# Patient Record
Sex: FEMALE | Race: WHITE | Employment: FULL TIME | URBAN - METROPOLITAN AREA
[De-identification: names, ages, dates, MRNs, and addresses within clinical notes are randomized per-mention and may not be internally consistent; named-entity substitution may affect disease eponyms.]

---

## 2022-07-27 ENCOUNTER — APPOINTMENT (OUTPATIENT)
Dept: ULTRASOUND IMAGING | Age: 27
End: 2022-07-27
Payer: COMMERCIAL

## 2022-07-27 ENCOUNTER — HOSPITAL ENCOUNTER (EMERGENCY)
Age: 27
Discharge: HOME | End: 2022-07-27
Payer: COMMERCIAL

## 2022-07-27 VITALS
WEIGHT: 215 LBS | OXYGEN SATURATION: 100 % | HEIGHT: 69 IN | TEMPERATURE: 97.8 F | DIASTOLIC BLOOD PRESSURE: 82 MMHG | HEART RATE: 77 BPM | RESPIRATION RATE: 16 BRPM | SYSTOLIC BLOOD PRESSURE: 133 MMHG | BODY MASS INDEX: 31.84 KG/M2

## 2022-07-27 DIAGNOSIS — O46.90 VAGINAL BLEEDING IN PREGNANCY: Primary | ICD-10-CM

## 2022-07-27 DIAGNOSIS — O20.0 THREATENED MISCARRIAGE IN EARLY PREGNANCY: ICD-10-CM

## 2022-07-27 LAB
ABO/RH: NORMAL
ANION GAP SERPL CALCULATED.3IONS-SCNC: 11 MMOL/L (ref 7–16)
BASOPHILS ABSOLUTE: 0.06 E9/L (ref 0–0.2)
BASOPHILS RELATIVE PERCENT: 0.7 % (ref 0–2)
BUN BLDV-MCNC: 11 MG/DL (ref 6–20)
CALCIUM SERPL-MCNC: 9.1 MG/DL (ref 8.6–10.2)
CHLORIDE BLD-SCNC: 104 MMOL/L (ref 98–107)
CO2: 23 MMOL/L (ref 22–29)
CREAT SERPL-MCNC: 0.7 MG/DL (ref 0.5–1)
EOSINOPHILS ABSOLUTE: 0.18 E9/L (ref 0.05–0.5)
EOSINOPHILS RELATIVE PERCENT: 2.1 % (ref 0–6)
GFR AFRICAN AMERICAN: >60
GFR NON-AFRICAN AMERICAN: >60 ML/MIN/1.73
GLUCOSE BLD-MCNC: 93 MG/DL (ref 74–99)
GONADOTROPIN, CHORIONIC (HCG) QUANT: 171.9 MIU/ML
HCG, URINE, POC: POSITIVE
HCT VFR BLD CALC: 39.6 % (ref 34–48)
HEMOGLOBIN: 13.3 G/DL (ref 11.5–15.5)
IMMATURE GRANULOCYTES #: 0.02 E9/L
IMMATURE GRANULOCYTES %: 0.2 % (ref 0–5)
LYMPHOCYTES ABSOLUTE: 2.58 E9/L (ref 1.5–4)
LYMPHOCYTES RELATIVE PERCENT: 29.5 % (ref 20–42)
Lab: NORMAL
MCH RBC QN AUTO: 29.6 PG (ref 26–35)
MCHC RBC AUTO-ENTMCNC: 33.6 % (ref 32–34.5)
MCV RBC AUTO: 88.2 FL (ref 80–99.9)
MONOCYTES ABSOLUTE: 0.75 E9/L (ref 0.1–0.95)
MONOCYTES RELATIVE PERCENT: 8.6 % (ref 2–12)
NEGATIVE QC PASS/FAIL: NORMAL
NEUTROPHILS ABSOLUTE: 5.17 E9/L (ref 1.8–7.3)
NEUTROPHILS RELATIVE PERCENT: 58.9 % (ref 43–80)
PDW BLD-RTO: 12.6 FL (ref 11.5–15)
PLATELET # BLD: 292 E9/L (ref 130–450)
PMV BLD AUTO: 9.3 FL (ref 7–12)
POSITIVE QC PASS/FAIL: NORMAL
POTASSIUM REFLEX MAGNESIUM: 4.1 MMOL/L (ref 3.5–5)
RBC # BLD: 4.49 E12/L (ref 3.5–5.5)
SODIUM BLD-SCNC: 138 MMOL/L (ref 132–146)
WBC # BLD: 8.8 E9/L (ref 4.5–11.5)

## 2022-07-27 PROCEDURE — 86900 BLOOD TYPING SEROLOGIC ABO: CPT

## 2022-07-27 PROCEDURE — 99284 EMERGENCY DEPT VISIT MOD MDM: CPT

## 2022-07-27 PROCEDURE — 76817 TRANSVAGINAL US OBSTETRIC: CPT

## 2022-07-27 PROCEDURE — 85025 COMPLETE CBC W/AUTO DIFF WBC: CPT

## 2022-07-27 PROCEDURE — 86901 BLOOD TYPING SEROLOGIC RH(D): CPT

## 2022-07-27 PROCEDURE — 80048 BASIC METABOLIC PNL TOTAL CA: CPT

## 2022-07-27 PROCEDURE — 84702 CHORIONIC GONADOTROPIN TEST: CPT

## 2022-07-27 ASSESSMENT — ENCOUNTER SYMPTOMS
VOMITING: 0
COUGH: 0
BACK PAIN: 0
SORE THROAT: 0
CONSTIPATION: 0
SHORTNESS OF BREATH: 0
TROUBLE SWALLOWING: 0
DIARRHEA: 0
CHEST TIGHTNESS: 0
ABDOMINAL PAIN: 0
FACIAL SWELLING: 0
NAUSEA: 0
COLOR CHANGE: 0
SINUS PAIN: 0
SINUS PRESSURE: 0

## 2022-07-27 ASSESSMENT — PAIN - FUNCTIONAL ASSESSMENT: PAIN_FUNCTIONAL_ASSESSMENT: 0-10

## 2022-07-27 ASSESSMENT — PAIN SCALES - GENERAL: PAINLEVEL_OUTOF10: 0

## 2022-07-28 NOTE — ED NOTES
Department of Emergency Medicine  FIRST PROVIDER TRIAGE NOTE             Independent MLP           7/27/22  8:32 PM EDT    Date of Encounter: 7/27/22   MRN: [id number]      HPI: Charly Bo is a 32 y.o. female who presents to the ED for Vaginal Bleeding (7 weeks pregnant having vaginal bleeding )     Pt is not having any pain. No confirmed prior US. Started having heavy bleeding today. ROS: Negative for cp or sob. PE: Gen Appearance/Constitutional: alert  Musculoskeletal: moves all extremities x 4     Initial Plan of Care: All treatment areas with department are currently occupied. Plan to order/Initiate the following while awaiting opening in ED: labs and ultrasound.   Initiate Treatment-Testing, Proceed toTreatment Area When Bed Available for ED Attending/MLP to Continue Care    Electronically signed by Terese Haney PA-C   DD: 7/27/22       Terese Haney PA-C  07/27/22 2032

## 2022-07-28 NOTE — ED PROVIDER NOTES
ED Attending shared visit  CC: No      Department of Emergency Medicine   ED  Provider Note  Admit Date/RoomTime: 2022 10:29 PM  ED Room: 36  HPI:  22, Time: 10:47 PM EDT      The patient is a 29-year-old female presenting to the emergency department with vaginal bleeding that started earlier today. Patient states that she thinks that she is about 7 to 8 weeks pregnant by her last menstrual period. Patient reports her LMP was July 10 through 15. Patient began having light vaginal bleeding today that is a little darker than a pink color. She does report that she had a stillbirth 2 months ago. Patient is not having any pain or cramping. She has not yet seen an OB/GYN because she is getting new insurance and does not know who she can see at this time. She is A1. Patient denies any pelvic pain, back pain, nausea/vomiting, passing of blood clots, dysuria or hematuria, dizziness. The history is provided by the patient. No  was used. REVIEW OF SYSTEMS:  Review of Systems   Constitutional:  Negative for activity change, chills, fatigue and fever. HENT:  Negative for congestion, ear pain, facial swelling, sinus pressure, sinus pain, sore throat and trouble swallowing. Respiratory:  Negative for cough, chest tightness and shortness of breath. Cardiovascular:  Negative for chest pain, palpitations and leg swelling. Gastrointestinal:  Negative for abdominal pain, constipation, diarrhea, nausea and vomiting. Genitourinary:  Positive for vaginal bleeding. Negative for dysuria, flank pain, frequency and hematuria. Musculoskeletal:  Negative for back pain, neck pain and neck stiffness. Skin:  Negative for color change, pallor and rash. Neurological:  Negative for dizziness, weakness, light-headedness and headaches. Psychiatric/Behavioral:  Negative for agitation, behavioral problems and confusion.      Pertinent positives and negatives are stated within HPI, all other systems reviewed and are negative.      --------------------------------------------- PAST HISTORY ---------------------------------------------  Past Medical History:  has no past medical history on file. Past Surgical History:  has no past surgical history on file. Social History:      Family History: family history is not on file. The patients home medications have been reviewed.     Allergies: Penicillins and Percocet [oxycodone-acetaminophen]    -------------------------------------------------- RESULTS -------------------------------------------------  All laboratory and radiology results have been personally reviewed by myself   LABS:  Results for orders placed or performed during the hospital encounter of 07/27/22   CBC with Auto Differential   Result Value Ref Range    WBC 8.8 4.5 - 11.5 E9/L    RBC 4.49 3.50 - 5.50 E12/L    Hemoglobin 13.3 11.5 - 15.5 g/dL    Hematocrit 39.6 34.0 - 48.0 %    MCV 88.2 80.0 - 99.9 fL    MCH 29.6 26.0 - 35.0 pg    MCHC 33.6 32.0 - 34.5 %    RDW 12.6 11.5 - 15.0 fL    Platelets 432 367 - 845 E9/L    MPV 9.3 7.0 - 12.0 fL    Neutrophils % 58.9 43.0 - 80.0 %    Immature Granulocytes % 0.2 0.0 - 5.0 %    Lymphocytes % 29.5 20.0 - 42.0 %    Monocytes % 8.6 2.0 - 12.0 %    Eosinophils % 2.1 0.0 - 6.0 %    Basophils % 0.7 0.0 - 2.0 %    Neutrophils Absolute 5.17 1.80 - 7.30 E9/L    Immature Granulocytes # 0.02 E9/L    Lymphocytes Absolute 2.58 1.50 - 4.00 E9/L    Monocytes Absolute 0.75 0.10 - 0.95 E9/L    Eosinophils Absolute 0.18 0.05 - 0.50 E9/L    Basophils Absolute 0.06 0.00 - 0.20 L8/N   Basic Metabolic Panel w/ Reflex to MG   Result Value Ref Range    Sodium 138 132 - 146 mmol/L    Potassium reflex Magnesium 4.1 3.5 - 5.0 mmol/L    Chloride 104 98 - 107 mmol/L    CO2 23 22 - 29 mmol/L    Anion Gap 11 7 - 16 mmol/L    Glucose 93 74 - 99 mg/dL    BUN 11 6 - 20 mg/dL    Creatinine 0.7 0.5 - 1.0 mg/dL    GFR Non-African American >60 >=60 mL/min/1.73    GFR African American >60     Calcium 9.1 8.6 - 10.2 mg/dL   HCG, Quantitative, Pregnancy   Result Value Ref Range    hCG Quant 171.9 (H) <10 mIU/mL   POC Pregnancy Urine   Result Value Ref Range    HCG, Urine, POC Positive Negative    Lot Number [id number]     Positive QC Pass/Fail Pass     Negative QC Pass/Fail Pass    ABO/RH   Result Value Ref Range    ABO/Rh O POS        RADIOLOGY:  Interpreted by Radiologist.  US OB TRANSVAGINAL   Final Result   1. Possible early intrauterine gestational sac. Mean sac diameter was 0.19   cm. This represents a gestational age of 2 weeks and 5 days. Clinical age   provided is 6 weeks and 5 days. Neither a fetal pole or yolk sac seen at   this time. 2.  Normal appearance of the bilateral ovaries. There are no adnexal masses. Normal ovarian vascularity. Recommendation: Serial quantitative beta HCG evaluation with repeat pelvic   ultrasound in 10-14 days to reassess. Imaging should occur sooner for   worsening symptoms. ------------------------- NURSING NOTES AND VITALS REVIEWED ---------------------------   The nursing notes within the ED encounter and vital signs as below have been reviewed. /82   Pulse 77   Temp 97.8 °F (36.6 °C)   Resp 16   Ht 5' 9\" (1.753 m)   Wt 215 lb (97.5 kg)   SpO2 100%   BMI 31.75 kg/m²   Oxygen Saturation Interpretation: Normal      ---------------------------------------------------PHYSICAL EXAM--------------------------------------    Physical Exam  Vitals and nursing note reviewed. Constitutional:       General: She is not in acute distress. Appearance: Normal appearance. She is well-developed. She is not ill-appearing. HENT:      Head: Normocephalic and atraumatic. Mouth/Throat:      Mouth: Mucous membranes are moist.      Pharynx: Oropharynx is clear. Neck:      Vascular: No JVD. Trachea: No tracheal deviation. Cardiovascular:      Rate and Rhythm: Normal rate and regular rhythm. Heart sounds: Normal heart sounds. No murmur heard. Pulmonary:      Effort: Pulmonary effort is normal. No respiratory distress. Breath sounds: Normal breath sounds. Abdominal:      General: Bowel sounds are normal. There is no distension. Palpations: Abdomen is soft. Tenderness: There is no abdominal tenderness. There is no guarding or rebound. Musculoskeletal:         General: No swelling or deformity. Normal range of motion. Skin:     General: Skin is warm and dry. Coloration: Skin is not pale. Findings: No erythema. Neurological:      Mental Status: She is alert. Psychiatric:         Mood and Affect: Mood normal.         Behavior: Behavior normal.         Thought Content: Thought content normal.          ------------------------------ ED COURSE/MEDICAL DECISION MAKING----------------------  Medications - No data to display      ED COURSE:     US OB TRANSVAGINAL   Final Result   1. Possible early intrauterine gestational sac. Mean sac diameter was 0.19   cm. This represents a gestational age of 2 weeks and 5 days. Clinical age   provided is 6 weeks and 5 days. Neither a fetal pole or yolk sac seen at   this time. 2.  Normal appearance of the bilateral ovaries. There are no adnexal masses. Normal ovarian vascularity. Recommendation: Serial quantitative beta HCG evaluation with repeat pelvic   ultrasound in 10-14 days to reassess. Imaging should occur sooner for   worsening symptoms. Procedures:  Procedures     Medical Decision Making:   MDM  20-year-old female who feels that she is about 7-8 weeks pregnant presenting the emergency department light vaginal bleeding that started today. Patient has no confirmed prior ultrasound. She is basing this 7-8 weeks off of her menstrual cycle which she thinks was somewhere between July 10-15. Patient does report history of a stillbirth 2 months ago.   She is afebrile and hemodynamically stable on arrival. Her hCG Laya Granger is only 171. Labs are otherwise unremarkable and she is O+. Ultrasound questions an early intrauterine gestational sac that correlates with 4 weeks and 5 days gestation. This does not actually correlate with the patient's LMP. There are no other abnormalities on the ultrasound to suggest an ectopic pregnancy although cannot completely be excluded at this time. Differentials include abnormal early pregnancy versus patient being too early for ultrasound evaluation. Patient will be given an outpatient order to have her hCG repeated in the next 48 hours. She is also given OB/GYN referral and encouraged follow-up with soon as possible. She is given a low threshold for return to the ED with any new or worsening symptoms. Counseling: The emergency provider has spoken with the patient and discussed todays results, in addition to providing specific details for the plan of care and counseling regarding the diagnosis and prognosis. Questions are answered at this time and they are agreeable with the plan.      --------------------------------- IMPRESSION AND DISPOSITION ---------------------------------    IMPRESSION  1. Vaginal bleeding in pregnancy    2. Threatened miscarriage in early pregnancy        DISPOSITION  Disposition: Discharge to home  Patient condition is good      Electronically signed by Terese Haney PA-C   DD: 7/27/22  **This report was transcribed using voice recognition software. Every effort was made to ensure accuracy; however, inadvertent computerized transcription errors may be present.   END OF ED PROVIDER NOTE            Terese Haney PA-C  07/27/22 [id number]

## 2022-10-24 ENCOUNTER — TELEPHONE (OUTPATIENT)
Dept: ADMINISTRATIVE | Age: 27
End: 2022-10-24

## 2022-10-24 NOTE — TELEPHONE ENCOUNTER
Pt called and requested to schedule a new OB appt with Dr. Alesia Davies. Her LMP was the end of July and first positive pregnancy test was 8/24. She has not had any prenatal care. She said she was told to call back between 8 and 12 weeks to schedule. No availability. Please contact pt.

## 2022-10-26 ENCOUNTER — INITIAL PRENATAL (OUTPATIENT)
Dept: OBGYN | Age: 27
End: 2022-10-26
Payer: COMMERCIAL

## 2022-10-26 VITALS
WEIGHT: 226.8 LBS | SYSTOLIC BLOOD PRESSURE: 115 MMHG | BODY MASS INDEX: 33.59 KG/M2 | HEART RATE: 97 BPM | DIASTOLIC BLOOD PRESSURE: 89 MMHG | HEIGHT: 69 IN

## 2022-10-26 DIAGNOSIS — Z3A.12 12 WEEKS GESTATION OF PREGNANCY: ICD-10-CM

## 2022-10-26 DIAGNOSIS — N91.2 AMENORRHEA: Primary | ICD-10-CM

## 2022-10-26 DIAGNOSIS — Z34.80 PREGNANCY IN MULTIGRAVIDA: ICD-10-CM

## 2022-10-26 DIAGNOSIS — O09.291 HISTORY OF STILLBIRTH IN PREGNANT PATIENT IN FIRST TRIMESTER, ANTEPARTUM: ICD-10-CM

## 2022-10-26 PROBLEM — O09.299 HISTORY OF STILLBIRTH IN CURRENTLY PREGNANT PATIENT: Status: ACTIVE | Noted: 2022-10-26

## 2022-10-26 LAB
AMPHETAMINE SCREEN, URINE: NOT DETECTED
BACTERIA: ABNORMAL /HPF
BARBITURATE SCREEN URINE: NOT DETECTED
BENZODIAZEPINE SCREEN, URINE: NOT DETECTED
BILIRUBIN URINE: NEGATIVE
BLOOD, URINE: NEGATIVE
CANNABINOID SCREEN URINE: NOT DETECTED
CLARITY: CLEAR
COCAINE METABOLITE SCREEN URINE: NOT DETECTED
COLOR: YELLOW
CONTROL: NORMAL
FENTANYL SCREEN, URINE: NOT DETECTED
GLUCOSE URINE, POC: NEGATIVE
GLUCOSE URINE: NEGATIVE MG/DL
KETONES, URINE: NEGATIVE MG/DL
LEUKOCYTE ESTERASE, URINE: ABNORMAL
Lab: NORMAL
METHADONE SCREEN, URINE: NOT DETECTED
NITRITE, URINE: NEGATIVE
OPIATE SCREEN URINE: NOT DETECTED
OXYCODONE URINE: NOT DETECTED
PH UA: 7 (ref 5–9)
PHENCYCLIDINE SCREEN URINE: NOT DETECTED
PREGNANCY TEST URINE, POC: POSITIVE
PROTEIN UA: NEGATIVE
PROTEIN UA: NEGATIVE MG/DL
RBC UA: ABNORMAL /HPF (ref 0–2)
SPECIFIC GRAVITY UA: 1.01 (ref 1–1.03)
UROBILINOGEN, URINE: 0.2 E.U./DL
WBC UA: ABNORMAL /HPF (ref 0–5)

## 2022-10-26 PROCEDURE — 99203 OFFICE O/P NEW LOW 30 MIN: CPT | Performed by: ADVANCED PRACTICE MIDWIFE

## 2022-10-26 PROCEDURE — 81025 URINE PREGNANCY TEST: CPT | Performed by: ADVANCED PRACTICE MIDWIFE

## 2022-10-26 PROCEDURE — 81002 URINALYSIS NONAUTO W/O SCOPE: CPT | Performed by: ADVANCED PRACTICE MIDWIFE

## 2022-10-26 NOTE — PROGRESS NOTES
New patient alert and pleasant with no complaints. Here today for initial prenatal visit. Pregnancy test positive today. Urine for glucose and protein obtained with negative results. Urines obtained, labeled and sent to the lab. Additional lab reqs given to patient and directed to lab to obtain. Discharge instructions have been discussed with the patient. Patient advised to call our office with any questions or concerns. Voiced understanding.

## 2022-10-26 NOTE — LETTER
[address] [place] [address].  [facility] [address]  Phone: [phone]  Fax: [phone]    NICOLE López CNM        November 3, 2022     Patient: Jesus Loja   YOB: 1995   Date of Visit: 10/26/2022       To Whom It May Concern: It is my medical opinion that Jesus Loja may return to full duty immediately with no restrictions. If you have any questions or concerns, please don't hesitate to call.     Sincerely,        NICOLE López CNM

## 2022-10-26 NOTE — PROGRESS NOTES
CHIEF COMPLAINT:  Patient here for amenorrhea and to establish herself as a new OB patient    Pt's mother with hx thyroid ca  HISTORY OF PRESENT ILLNESS:                32 y.o.  female here for amenorrhea and new ob. Pt denies any VB and is tolerating po daily. Pt is taking PNV. She has a history of having a stillborn baby with last pregnancy,  2022 at [facility]. Uncertain etiology with the stillbirth, states did not have an autopsy but remembers that she had a wasp sting on 2022 and later found out she is allergic, she believes the wasp sting may have been a causative/contributing factor. She reports some anxiety around being pregnant after this loss but says she believes she is coping well and has a good support system. Allergies:  Penicillins, Percocet [oxycodone-acetaminophen], and Wasp venom  History reviewed. No pertinent past medical history. Uncertain reaction to pcn, patient's mother says hives. 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     Past Surgical History:   Procedure Laterality Date    CHOLECYSTECTOMY       OB History          4    Para   3    Term   2       1    AB        Living   2         SAB        IAB        Ectopic        Molar        Multiple        Live Births   2              Family History   Problem Relation Age of Onset    Heart Attack Father      Social History     Socioeconomic History    Marital status:      Spouse name: Not on file    Number of children: Not on file    Years of education: Not on file    Highest education level: Not on file   Occupational History    Not on file   Tobacco Use    Smoking status: Former     Types: Cigarettes    Smokeless tobacco: Never   Vaping Use    Vaping Use: Never used   Substance and Sexual Activity    Alcohol use: Not Currently     Comment: social    Drug use: Never    Sexual activity: Yes     Partners: Male   Other Topics Concern    Not on file   Social History Narrative    Not on file     Social Determinants of Health     Financial Resource Strain: Not on file   Food Insecurity: Not on file   Transportation Needs: Not on file   Physical Activity: Not on file   Stress: Not on file   Social Connections: Not on file   Intimate Partner Violence: Not on file   Housing Stability: Not on file         Review of Systems:  General ROS: negative  Psychological ROS: negative  ENT ROS: negative  Endocrine ROS: negative  Respiratory ROS: no cough, shortness of breath, or wheezing  Cardiovascular ROS: no chest pain or dyspnea on exertion  Gastrointestinal ROS: no abdominal pain, change in bowel habits, or black or bloody stools  Genito-Urinary ROS: no dysuria, trouble voiding, or hematuria  Musculoskeletal ROS: negative  Neurological ROS: no TIA or stroke symptoms  Dermatological ROS: negative    OBJECTIVE:   /89 (Position: Sitting)   Pulse 97   Ht 5' 9\" (1.753 m)   Wt 226 lb 12.8 oz (102.9 kg)   LMP 07/28/2022 (Approximate)   BMI 33.49 kg/m²   Patient's last menstrual period was 07/28/2022 (approximate). Physical Exam:  GEN: She appears well, overweight, afebrile. HEENT: normal cephalic, atraumatic  CVS: regular rate and rhythm  ABDOMEN: benign, soft, nontender, no masses. Unable to locate FHR with doppler. MUSCULOSKELETAL: normal gait, no masses  SKIN: normal texture and tone, no lesions  NEURO: normal tone, no hyperreflexia, 1+DTRs throughout    Pelvic Exam:   deferred      ASSESSMENT:    Diagnosis Orders   1. Amenorrhea  POCT urine pregnancy      2. 12 weeks gestation of pregnancy  POCT urine qual dipstick glucose    POCT urine qual dipstick protein          PLAN:   Need for prenatal vitamins and Folic acid supplementation reviewed. Expected prenatal care course reviewed.  Discussed early glucose testing and recommend consulting [facility], Dr. Real Newby regarding previous stillbirth for recommendations for this pregnancy. Patient agrees to all of this. New OB labs ordered today as well as ultrasound ASAP for dating and viability, states that her LMP date is approximate (\"last week or two of July\").     Orders Placed This Encounter   Procedures    C.TRACHOMATIS N.GONORRHOEAE DNA,URINE     Standing Status:   Future     Standing Expiration Date:   10/26/2023     OB < 14 Weeks Single Fetus - Clinic Performed     Standing Status:   Future     Standing Expiration Date:   10/26/2023     Order Specific Question:   Reason for exam:     Answer:   Dates/viability    CBC with Auto Differential     Standing Status:   Future     Standing Expiration Date:   10/26/2023    Hepatitis B Surface Antigen     Standing Status:   Future     Standing Expiration Date:   10/26/2023    HIV Screen     Standing Status:   Future     Standing Expiration Date:   10/26/2023    RPR     Standing Status:   Future     Standing Expiration Date:   10/26/2023    Rubella     Standing Status:   Future     Standing Expiration Date:   10/26/2023    Urine Drug Screen     Standing Status:   Future     Standing Expiration Date:   10/26/2023    Varicella Zoster Antibody, IgG     Standing Status:   Future     Standing Expiration Date:   10/26/2023    Microscopic Urinalysis     Standing Status:   Future     Standing Expiration Date:   10/26/2023    Glucose tolerance, 1 hour     Standing Status:   Future     Standing Expiration Date:   10/27/2023    Alejandro Balderas MD. Maternal Fetal Medicine, [place]     Referral Priority:   Routine     Referral Type:   Eval and Treat     Referral Reason:   Specialty Services Required     Requested Specialty:   Alternative Medicine     Number of Visits Requested:   1    POCT urine qual dipstick glucose    POCT urine qual dipstick protein    POCT urine pregnancy    Angelito test, indirect, qualitative     Standing Status:   Future     Standing Expiration Date:   10/27/2023    Type and Screen     Standing Status:   Future     Standing Expiration Date:   10/26/2023       Follow up:  Return in about 4 weeks (around 11/23/2022), or if symptoms worsen or fail to improve.      Electronically signed by NICOLE Watson CNM on 10/26/22

## 2022-10-31 ENCOUNTER — HOSPITAL ENCOUNTER (OUTPATIENT)
Age: 27
Discharge: HOME | End: 2022-10-31
Payer: COMMERCIAL

## 2022-10-31 DIAGNOSIS — Z34.80 PREGNANCY IN MULTIGRAVIDA: ICD-10-CM

## 2022-10-31 DIAGNOSIS — O09.291 HISTORY OF STILLBIRTH IN PREGNANT PATIENT IN FIRST TRIMESTER, ANTEPARTUM: ICD-10-CM

## 2022-10-31 LAB
ABO/RH: NORMAL
ANTIBODY SCREEN: NORMAL
BASOPHILS ABSOLUTE: 0.04 E9/L (ref 0–0.2)
BASOPHILS RELATIVE PERCENT: 0.5 % (ref 0–2)
C. TRACHOMATIS DNA ,URINE: NEGATIVE
EOSINOPHILS ABSOLUTE: 0.13 E9/L (ref 0.05–0.5)
EOSINOPHILS RELATIVE PERCENT: 1.5 % (ref 0–6)
GLUCOSE TOLERANCE SCREEN 50G: 165 MG/DL (ref 70–140)
HCT VFR BLD CALC: 35.8 % (ref 34–48)
HEMOGLOBIN: 12.2 G/DL (ref 11.5–15.5)
IMMATURE GRANULOCYTES #: 0.04 E9/L
IMMATURE GRANULOCYTES %: 0.5 % (ref 0–5)
LYMPHOCYTES ABSOLUTE: 1.66 E9/L (ref 1.5–4)
LYMPHOCYTES RELATIVE PERCENT: 18.8 % (ref 20–42)
MCH RBC QN AUTO: 29.3 PG (ref 26–35)
MCHC RBC AUTO-ENTMCNC: 34.1 % (ref 32–34.5)
MCV RBC AUTO: 86.1 FL (ref 80–99.9)
MONOCYTES ABSOLUTE: 0.61 E9/L (ref 0.1–0.95)
MONOCYTES RELATIVE PERCENT: 6.9 % (ref 2–12)
N. GONORRHOEAE DNA, URINE: NEGATIVE
NEUTROPHILS ABSOLUTE: 6.33 E9/L (ref 1.8–7.3)
NEUTROPHILS RELATIVE PERCENT: 71.8 % (ref 43–80)
PDW BLD-RTO: 12.8 FL (ref 11.5–15)
PLATELET # BLD: 282 E9/L (ref 130–450)
PMV BLD AUTO: 9.8 FL (ref 7–12)
RBC # BLD: 4.16 E12/L (ref 3.5–5.5)
SOURCE: NORMAL
WBC # BLD: 8.8 E9/L (ref 4.5–11.5)

## 2022-10-31 PROCEDURE — 86787 VARICELLA-ZOSTER ANTIBODY: CPT

## 2022-10-31 PROCEDURE — 82950 GLUCOSE TEST: CPT

## 2022-10-31 PROCEDURE — 86762 RUBELLA ANTIBODY: CPT

## 2022-10-31 PROCEDURE — 87340 HEPATITIS B SURFACE AG IA: CPT

## 2022-10-31 PROCEDURE — 36415 COLL VENOUS BLD VENIPUNCTURE: CPT

## 2022-10-31 PROCEDURE — 85025 COMPLETE CBC W/AUTO DIFF WBC: CPT

## 2022-10-31 PROCEDURE — 86592 SYPHILIS TEST NON-TREP QUAL: CPT

## 2022-10-31 PROCEDURE — 86850 RBC ANTIBODY SCREEN: CPT

## 2022-10-31 PROCEDURE — 86703 HIV-1/HIV-2 1 RESULT ANTBDY: CPT

## 2022-10-31 PROCEDURE — 86901 BLOOD TYPING SEROLOGIC RH(D): CPT

## 2022-10-31 PROCEDURE — 86900 BLOOD TYPING SEROLOGIC ABO: CPT

## 2022-11-01 LAB
HEPATITIS B SURFACE ANTIGEN INTERPRETATION: NORMAL
HIV-1 AND HIV-2 ANTIBODIES: NORMAL
RPR: NORMAL
VARICELLA-ZOSTER VIRUS AB, IGG: NORMAL

## 2022-11-03 ENCOUNTER — TELEPHONE (OUTPATIENT)
Dept: OBGYN | Age: 27
End: 2022-11-03

## 2022-11-03 DIAGNOSIS — Z34.82 SUPERVISION OF NORMAL INTRAUTERINE PREGNANCY IN MULTIGRAVIDA IN SECOND TRIMESTER: Primary | ICD-10-CM

## 2022-11-03 DIAGNOSIS — R73.09 ABNORMAL GLUCOSE MEASUREMENT: ICD-10-CM

## 2022-11-03 LAB — RUBELLA ANTIBODY IGG: NORMAL

## 2022-11-03 NOTE — TELEPHONE ENCOUNTER
Voicemail message left for patient re: abnormal 1-hour glucose tolerance testing. Order placed for 2-hour glucose challenge testing. Will have patient complete 2-hour challenge and go from there. Advised patient to contact office with any questions/concerns.

## 2022-11-04 DIAGNOSIS — Z34.82 SUPERVISION OF NORMAL INTRAUTERINE PREGNANCY IN MULTIGRAVIDA IN SECOND TRIMESTER: ICD-10-CM

## 2022-11-04 DIAGNOSIS — R73.09 ABNORMAL GLUCOSE MEASUREMENT: Primary | ICD-10-CM

## 2022-11-07 ENCOUNTER — HOSPITAL ENCOUNTER (OUTPATIENT)
Age: 27
Discharge: HOME | End: 2022-11-07
Payer: COMMERCIAL

## 2022-11-07 DIAGNOSIS — Z34.82 SUPERVISION OF NORMAL INTRAUTERINE PREGNANCY IN MULTIGRAVIDA IN SECOND TRIMESTER: ICD-10-CM

## 2022-11-07 DIAGNOSIS — R73.09 ABNORMAL GLUCOSE MEASUREMENT: ICD-10-CM

## 2022-11-07 LAB
GLUCOSE FASTING: 90 MG/ML
GLUCOSE TOLERANCE TEST 1 HOUR: 157 MG/DL
GLUCOSE TOLERANCE TEST 2 HOUR: 178 MG/DL
GLUCOSE TOLERANCE TEST 3 HOUR: 112 MG/DL

## 2022-11-07 PROCEDURE — 82951 GLUCOSE TOLERANCE TEST (GTT): CPT

## 2022-11-07 PROCEDURE — 36415 COLL VENOUS BLD VENIPUNCTURE: CPT

## 2022-11-07 PROCEDURE — 82952 GTT-ADDED SAMPLES: CPT

## 2022-11-10 ENCOUNTER — HOSPITAL ENCOUNTER (OUTPATIENT)
Dept: ULTRASOUND IMAGING | Age: 27
Discharge: HOME | End: 2022-11-12
Payer: COMMERCIAL

## 2022-11-10 DIAGNOSIS — O09.213 CURRENT PREGNANCY WITH HISTORY OF PRE-TERM LABOR IN THIRD TRIMESTER: ICD-10-CM

## 2022-11-10 DIAGNOSIS — Z34.83 SUPERVISION OF NORMAL INTRAUTERINE PREGNANCY IN MULTIGRAVIDA IN THIRD TRIMESTER: ICD-10-CM

## 2022-11-10 PROCEDURE — 76805 OB US >/= 14 WKS SNGL FETUS: CPT

## 2022-11-23 ENCOUNTER — ROUTINE PRENATAL (OUTPATIENT)
Dept: OBGYN | Age: 27
End: 2022-11-23
Payer: COMMERCIAL

## 2022-11-23 VITALS
BODY MASS INDEX: 32.78 KG/M2 | WEIGHT: 222 LBS | HEART RATE: 95 BPM | DIASTOLIC BLOOD PRESSURE: 73 MMHG | SYSTOLIC BLOOD PRESSURE: 116 MMHG

## 2022-11-23 DIAGNOSIS — Z34.82 ENCOUNTER FOR SUPERVISION OF OTHER NORMAL PREGNANCY, SECOND TRIMESTER: Primary | ICD-10-CM

## 2022-11-23 LAB
GLUCOSE URINE, POC: NEGATIVE
PROTEIN UA: NEGATIVE

## 2022-11-23 PROCEDURE — 81002 URINALYSIS NONAUTO W/O SCOPE: CPT | Performed by: ADVANCED PRACTICE MIDWIFE

## 2022-11-23 PROCEDURE — 99213 OFFICE O/P EST LOW 20 MIN: CPT | Performed by: ADVANCED PRACTICE MIDWIFE

## 2022-11-23 PROCEDURE — 0502F SUBSEQUENT PRENATAL CARE: CPT | Performed by: ADVANCED PRACTICE MIDWIFE

## 2022-11-23 NOTE — PROGRESS NOTES
Rubina Quintana is a 32 y.o. female 16w6d    [id number]    OB History    Para Term  AB Living   4 3 2 1   2   SAB IAB Ectopic Molar Multiple Live Births             2      # Outcome Date GA Lbr Maximiliano/2nd Weight Sex Delivery Anes PTL Lv   4 Current            3  22 29w5d  1 lb 15.2 oz (0.885 kg)  Vag-Spont EPI N FD   2 Term    8 lb (3.63 kg) F Vag-Spont   EVER   1 Term    6 lb (2.722 kg) M Vag-Spont   EVER         Mother's Prenatal Vitals  BP: 116/73  Weight: 222 lb (100.7 kg)  Heart Rate: 95  Patient Position: Sitting  Alb/Glu  Albumin: Negative  Glucose: Negative  Prenatal Fetal Information  Fundal Height (cm): 16 cm  Fetal HR: 144  Movement: Present    The patient was seen and evaluated. There was positive fetal movements. No contractions or leakage of fluid. Signs and symptoms of  labor as well as labor were reviewed. The patients anatomy ultrasound has been ordered. She has an appointment with [facility] on 2022. The S/S of Pre-Eclampsia were reviewed with the patient in detail. She is to report any of these if they occur. She currently denies any of these. The patient is RH positive Rhogam Ordered no    Assessment:  1. Rubina Quintana is a 32 y.o. female  2. [id number]  3. 16w6d    Patient Active Problem List    Diagnosis Date Noted    Abnormal glucose measurement 2022     Priority: Medium    History of stillbirth in currently pregnant patient 10/26/2022     Priority: Medium    Pregnancy in multigravida 10/26/2022     Priority: Medium         Plan:  The patient will return to the office for her next visit in 3-4 weeks. See antepartum flow sheet.

## 2022-11-28 PROBLEM — Z34.82 ENCOUNTER FOR SUPERVISION OF OTHER NORMAL PREGNANCY, SECOND TRIMESTER: Status: ACTIVE | Noted: 2022-11-28

## 2022-12-01 ENCOUNTER — ANCILLARY PROCEDURE (OUTPATIENT)
Dept: OBGYN CLINIC | Age: 27
End: 2022-12-01
Payer: COMMERCIAL

## 2022-12-01 ENCOUNTER — INITIAL PRENATAL (OUTPATIENT)
Dept: OBGYN CLINIC | Age: 27
End: 2022-12-01
Payer: COMMERCIAL

## 2022-12-01 VITALS — BODY MASS INDEX: 33.08 KG/M2 | HEART RATE: 101 BPM | WEIGHT: 224 LBS

## 2022-12-01 DIAGNOSIS — G93.0 CHOROID PLEXUS CYST: ICD-10-CM

## 2022-12-01 DIAGNOSIS — Z34.90 FOURTH PREGNANCY: ICD-10-CM

## 2022-12-01 DIAGNOSIS — O09.292 HISTORY OF STILLBIRTH IN PREGNANT PATIENT IN SECOND TRIMESTER, ANTEPARTUM: Primary | ICD-10-CM

## 2022-12-01 DIAGNOSIS — Z3A.18 18 WEEKS GESTATION OF PREGNANCY: ICD-10-CM

## 2022-12-01 LAB
GLUCOSE URINE, POC: NORMAL
PROTEIN UA: NEGATIVE

## 2022-12-01 PROCEDURE — 81002 URINALYSIS NONAUTO W/O SCOPE: CPT | Performed by: OBSTETRICS & GYNECOLOGY

## 2022-12-01 PROCEDURE — 76811 OB US DETAILED SNGL FETUS: CPT | Performed by: OBSTETRICS & GYNECOLOGY

## 2022-12-01 PROCEDURE — 99203 OFFICE O/P NEW LOW 30 MIN: CPT | Performed by: OBSTETRICS & GYNECOLOGY

## 2022-12-01 PROCEDURE — 99999 PR OFFICE/OUTPT VISIT,PROCEDURE ONLY: CPT | Performed by: OBSTETRICS & GYNECOLOGY

## 2022-12-01 NOTE — PROGRESS NOTES
Patient is here today for ob new visit. Denies any vaginal bleeding, cramping, or leakage of fluids. Patient reports good fetal movement.

## 2022-12-01 NOTE — PROGRESS NOTES
[address] FETAL MEDICINE  [address] [facility] [address]  [phone]   [email or web address] FETAL MEDICINE  [address]  [facility] [address]  Dept: [address] [facility]: [phone]     2022    RE:  Adria Bains     : 1995   AGE: 32 y.o. Dear Dr. Ioana Calzada,    Thank you for allowing me to see Adria Bains. As I'm sure you will recall, Adria Bains is a 32 y.o. [id number]'s last menstrual period was 2022 (approximate).  Estimated Date of Delivery: 23 at 18w0d seen in our office today for the following:    REASON FOR VISIT: Level II    Patient Active Problem List    Diagnosis Date Noted    18 weeks gestation of pregnancy 2022    Fourth pregnancy 2022    Choroid plexus cyst 2022    Encounter for supervision of other normal pregnancy, second trimester 2022    Abnormal glucose measurement 2022    History of stillbirth in currently pregnant patient 10/26/2022    Pregnancy in multigravida 10/26/2022        PAST HISTORY:  OB History    Para Term  AB Living   4 3 2 1   2   SAB IAB Ectopic Molar Multiple Live Births             2      # Outcome Date GA Lbr Maximiliano/2nd Weight Sex Delivery Anes PTL Lv   4 Current            3  22 29w5d  1 lb 15.2 oz (0.885 kg) M Vag-Spont EPI N FD   2 Term 17 39w3d  8 lb (3.63 kg) F Vag-Spont EPI N EVER   1 Term 10/19/14 39w3d  6 lb (2.722 kg) M Vag-Spont EPI N EVER          MEDICAL:  Past Medical History:   Diagnosis Date     delivery         SURGICAL:  Past Surgical History:   Procedure Laterality Date    CHOLECYSTECTOMY         ALLERGIES:    Allergies   Allergen Reactions    Wasp Venom Swelling    Penicillins Hives    Percocet [Oxycodone-Acetaminophen]          MEDICATIONS:    Current Outpatient Medications Medication Sig Dispense Refill    Prenatal Vit-Fe Fumarate-FA (PRENATAL 19 PO) Take by mouth       No current facility-administered medications for this visit. Social History     Socioeconomic History    Marital status:      Spouse name: None    Number of children: None    Years of education: None    Highest education level: None   Tobacco Use    Smoking status: Former     Types: Cigarettes    Smokeless tobacco: Never   Vaping Use    Vaping Use: Never used   Substance and Sexual Activity    Alcohol use: Not Currently     Comment: social    Drug use: Never    Sexual activity: Yes     Partners: Male          FAMILY MEDICAL HISTORY:   Family History   Problem Relation Age of Onset    Heart Attack Father           PHYSICAL EXAMINATION:  Pulse (!) 101   Wt 224 lb (101.6 kg)   LMP 07/28/2022 (Approximate)   Body mass index is 33.08 kg/m². Urine dipstick:  No results found for this visit on 12/01/22. A/an Level II ultrasound was done in our office today. Please refer to the enclosed copy of the ultrasound report for further information. Discussion:  There is a murphy fetus in a cephalic presentation. Fetal cardiac motion and fetal motion was detected and appeared to be grossly normal.  There was bilateral choroid plexus cysts noted. No additional anomalies were noted. Scan was somewhat limited by maternal habitus and fetal gestational age. The placenta was posterior. Placental lakes are noted. Amniotic fluid volume is normal.  The cervix was long and closed. The patient and her  declined NIPT testing at this point in time. IMPRESSION:  1. Single intrauterine gestation at 18 weeks with appropriate interval growth bilateral choroid plexus cysts. 2. The fetal anatomy appears normal with the exception of the bilateral choroid plexus cysts and the fetal lie is cephalic. 3. The amniotic fluid volume is normal and the placenta is posterior.     RECOMMENDATIONS:  Each of the recommendations were discussed with the patient:  1. Return in 4 weeks for completion of anatomy and follow-up of choroid plexus cysts. 2.  Begin weekly biophysical profiles with NSTs at 28 weeks gestation. 3.  Delivery at 39 weeks gestation. 4.  Follow-up otherwise as clinically indicated. 5.  Growth ultrasounds every 4 weeks. The patient is to continue to follow with you in your office for ongoing obstetric care. PLAN:    As noted above or sooner prn.     Sincerely,        Sudarshan Cerrato MD

## 2022-12-21 ENCOUNTER — ROUTINE PRENATAL (OUTPATIENT)
Dept: OBGYN | Age: 27
End: 2022-12-21
Payer: COMMERCIAL

## 2022-12-21 VITALS
HEART RATE: 87 BPM | SYSTOLIC BLOOD PRESSURE: 125 MMHG | DIASTOLIC BLOOD PRESSURE: 68 MMHG | WEIGHT: 230.2 LBS | BODY MASS INDEX: 33.99 KG/M2

## 2022-12-21 DIAGNOSIS — Z34.92 ENCOUNTER FOR SUPERVISION OF NORMAL PREGNANCY IN SECOND TRIMESTER, UNSPECIFIED GRAVIDITY: Primary | ICD-10-CM

## 2022-12-21 LAB
GLUCOSE URINE, POC: NEGATIVE
PROTEIN UA: NEGATIVE

## 2022-12-21 PROCEDURE — 81002 URINALYSIS NONAUTO W/O SCOPE: CPT | Performed by: ADVANCED PRACTICE MIDWIFE

## 2022-12-21 PROCEDURE — 99213 OFFICE O/P EST LOW 20 MIN: CPT | Performed by: ADVANCED PRACTICE MIDWIFE

## 2022-12-21 NOTE — PROGRESS NOTES
Rich Amaya is a 32 y.o. female 23w11d    Had anatomy u/s - girl goes back on 6th for f/u for choroid plexus cyst  Feeling movement regularly        OB History    Para Term  AB Living   4 3 2 1   2   SAB IAB Ectopic Molar Multiple Live Births             2      # Outcome Date GA Lbr Maximiliano/2nd Weight Sex Delivery Anes PTL Lv   4 Current            3  22 29w5d  1 lb 15.2 oz (0.885 kg) M Vag-Spont EPI N FD   2 Term 17 39w3d  8 lb (3.63 kg) F Vag-Spont EPI N EVER   1 Term 10/19/14 39w3d  6 lb (2.722 kg) M Vag-Spont EPI N EVER         Mother's Prenatal Vitals  BP: 125/68  Weight: 230 lb 3.2 oz (104.4 kg)  Heart Rate: 87  Patient Position: Sitting  Alb/Glu  Albumin: Negative  Glucose: Negative  Prenatal Fetal Information  Fetal HR: 144  Movement: Present    The patient was seen and evaluated. There was positive fetal movements. No contractions or leakage of fluid. Signs and symptoms of  labor as well as labor were reviewed. The patients anatomy ultrasound has been completed and reviewed with patient, she has an appointment on 23 for repeat look at choroid plexus cyst.     The S/S of Pre-Eclampsia were reviewed with the patient in detail. She is to report any of these if they occur. She currently denies any of these. The patient is RH positive Rhogam Ordered no    Assessment:  1. Rich Amaya is a 32 y.o. female  2. [id number]  3. 20w6d  4. Repeat u/s for choroid plexus cyst scheduled 23  5. Abnormal 1-hour GTT, normal 3-hour GCT. Will do 2-hour GTT at 26-28 weeks.     Patient Active Problem List    Diagnosis Date Noted    18 weeks gestation of pregnancy 2022     Priority: Medium    Fourth pregnancy 2022     Priority: Medium    Choroid plexus cyst 2022     Priority: Medium    Encounter for supervision of other normal pregnancy, second trimester 2022     Priority: Medium    Abnormal glucose measurement 2022     Priority: Medium History of stillbirth in currently pregnant patient 10/26/2022     Priority: Medium    Pregnancy in multigravida 10/26/2022     Priority: Medium        Diagnosis Orders   1. Encounter for supervision of normal pregnancy in second trimester, unspecified   POCT urine qual dipstick glucose    POCT urine qual dipstick protein            Plan:  The patient will return to the office for her next visit in 4 weeks. See antepartum flow sheet.

## 2022-12-22 PROBLEM — Z3A.20 20 WEEKS GESTATION OF PREGNANCY: Status: ACTIVE | Noted: 2022-12-01

## 2023-01-06 ENCOUNTER — ANCILLARY PROCEDURE (OUTPATIENT)
Dept: OBGYN CLINIC | Age: 28
End: 2023-01-06
Payer: COMMERCIAL

## 2023-01-06 ENCOUNTER — ROUTINE PRENATAL (OUTPATIENT)
Dept: OBGYN CLINIC | Age: 28
End: 2023-01-06
Payer: COMMERCIAL

## 2023-01-06 VITALS
SYSTOLIC BLOOD PRESSURE: 137 MMHG | BODY MASS INDEX: 33.71 KG/M2 | HEART RATE: 103 BPM | WEIGHT: 228.25 LBS | DIASTOLIC BLOOD PRESSURE: 80 MMHG

## 2023-01-06 DIAGNOSIS — Z3A.23 23 WEEKS GESTATION OF PREGNANCY: Primary | ICD-10-CM

## 2023-01-06 PROBLEM — G93.0 CHOROID PLEXUS CYST: Status: RESOLVED | Noted: 2022-12-01 | Resolved: 2023-01-06

## 2023-01-06 LAB
GLUCOSE URINE, POC: NEGATIVE
PROTEIN UA: NEGATIVE

## 2023-01-06 PROCEDURE — 81002 URINALYSIS NONAUTO W/O SCOPE: CPT | Performed by: OBSTETRICS & GYNECOLOGY

## 2023-01-06 PROCEDURE — 76816 OB US FOLLOW-UP PER FETUS: CPT | Performed by: OBSTETRICS & GYNECOLOGY

## 2023-01-06 PROCEDURE — 99213 OFFICE O/P EST LOW 20 MIN: CPT | Performed by: OBSTETRICS & GYNECOLOGY

## 2023-01-06 NOTE — PATIENT INSTRUCTIONS
Please arrive for your scheduled appointment at least 15 minutes early with your actual insurance card+ a photo ID. Also if you need any refills ordered or have questions, it may take up 48 hours to reply. Please allow ample time for your refills. Call me when you use last refill. Thank you for your cooperation. Call your primary obstetrician with bleeding, leaking of fluid, abdominal tenderness, headache, blurry vision, epigastric pain and increased urinary frequency. If you are experiencing an emergency and need immediate help, call 911 or go to go emergency room or labor and delivery. if you are sick, not feeling well or have an infectious process going on please reschedule your appointment by calling [phone]. Also if any family members are not feeling well, please do not bring them to your appointment. We appreciate your cooperation. We are doing this in order to protect our pregnant mothers+ their babies. if you are sick, not feeling well or have an infectious process going on please reschedule your appointment by calling [phone]. Also if any family members are not feeling well, please do not bring them to your appointment. We appreciate your cooperation. We are doing this in order to protect our pregnant mothers+ their babies.

## 2023-01-06 NOTE — PROGRESS NOTES
[address]  [facility]  [address] [facility] [address] [phone]   [address] [facility]  [address] [address]  Dept: [address] [address]: [phone]     2023    RE:  Phillip Foss     : 1995   AGE: 32 y.o. Dear Dr. Adeel Pelletier,    Thank you for allowing me to see Phillip Foss. As I'm sure you will recall, Phillip Foss is a 32 y.o. [id number]'s last menstrual period was 2022 (approximate).  Estimated Date of Delivery: 23 at 23w1d seen in our office today for the following:    REASON FOR VISIT: Completion of anatomy    Patient Active Problem List    Diagnosis Date Noted    23 weeks gestation of pregnancy 2022    Fourth pregnancy 2022    Encounter for supervision of other normal pregnancy, second trimester 2022    Abnormal glucose measurement 2022    History of stillbirth in currently pregnant patient 10/26/2022    Pregnancy in multigravida 10/26/2022        PAST HISTORY:  OB History    Para Term  AB Living   4 3 2 1   2   SAB IAB Ectopic Molar Multiple Live Births             2      # Outcome Date GA Lbr Maximiliano/2nd Weight Sex Delivery Anes PTL Lv   4 Current            3  22 29w5d  1 lb 15.2 oz (0.885 kg) M Vag-Spont EPI N FD   2 Term 17 39w3d  8 lb (3.63 kg) F Vag-Spont EPI N EVER   1 Term 10/19/14 39w3d  6 lb (2.722 kg) M Vag-Spont EPI N EVER          MEDICAL:  Past Medical History:   Diagnosis Date     delivery         SURGICAL:  Past Surgical History:   Procedure Laterality Date    CHOLECYSTECTOMY         ALLERGIES:    Allergies   Allergen Reactions    Wasp Venom Swelling    Penicillins Hives    Percocet [Oxycodone-Acetaminophen]          MEDICATIONS:    Current Outpatient Medications   Medication Sig Dispense Refill    Prenatal Vit-Fe Fumarate-FA (PRENATAL 19 PO) Take by mouth       No current facility-administered medications for this visit. Social History     Socioeconomic History    Marital status:      Spouse name: None    Number of children: None    Years of education: None    Highest education level: None   Tobacco Use    Smoking status: Former     Types: Cigarettes    Smokeless tobacco: Never   Vaping Use    Vaping Use: Never used   Substance and Sexual Activity    Alcohol use: Not Currently     Comment: social    Drug use: Never    Sexual activity: Yes     Partners: Male          FAMILY MEDICAL HISTORY:   Family History   Problem Relation Age of Onset    Heart Attack Father           PHYSICAL EXAMINATION:  /80   Pulse (!) 103   Wt 228 lb 4 oz (103.5 kg)   LMP 07/28/2022 (Approximate)   Body mass index is 33.71 kg/m². Urine dipstick:  Results for POC orders placed in visit on 01/06/23   POCT urine qual dipstick protein   Result Value Ref Range    Protein, UA Negative Negative   POCT urine qual dipstick glucose   Result Value Ref Range    Glucose, UA POC negative         A/an completion of anatomy ultrasound was done in our office today. Please refer to the enclosed copy of the ultrasound report for further information. Discussion:  There is a murphy fetus in a vertex presentation. Fetal cardiac motion and fetal motion was detected and appeared to be grossly normal.  There is been appropriate interval growth. We were able to complete the anatomy scan. There is been resolution of the previously noted choroid plexus cyst.  The placenta is posterior. Amniotic fluid volume is normal.  I would recommend beginning biophysical profiles at 28 weeks given the demise at 33 weeks. Unfortunately I did not discuss this with the patient. I would still recommend this. IMPRESSION:  1.  Single intrauterine gestation at 23+ weeks with appropriate interval growth and resolution of the previously noted choroid plexus cysts. 2. The fetal anatomy appears normal and the fetal lie is cephalic. 3. The amniotic fluid volume is normal and the placenta is posterior. RECOMMENDATIONS:  Each of the recommendations were discussed with the patient:  1. Again begin growth ultrasounds, biophysical profiles and NSTs at 28 weeks due to the prior demise. 2.  Delivery at 39 weeks gestation. 3.  Follow-up would be otherwise as clinically indicated. The patient is to continue to follow with you in your office for ongoing obstetric care. PLAN:    As noted above or sooner prn.     Sincerely,        Elvira Montgomery MD

## 2023-01-18 ENCOUNTER — ROUTINE PRENATAL (OUTPATIENT)
Dept: OBGYN | Age: 28
End: 2023-01-18
Payer: COMMERCIAL

## 2023-01-18 VITALS
DIASTOLIC BLOOD PRESSURE: 82 MMHG | HEART RATE: 98 BPM | WEIGHT: 229.4 LBS | SYSTOLIC BLOOD PRESSURE: 128 MMHG | BODY MASS INDEX: 33.88 KG/M2

## 2023-01-18 DIAGNOSIS — O09.292 HISTORY OF STILLBIRTH IN PREGNANT PATIENT IN SECOND TRIMESTER, ANTEPARTUM: ICD-10-CM

## 2023-01-18 DIAGNOSIS — Z34.92 ENCOUNTER FOR SUPERVISION OF NORMAL PREGNANCY IN SECOND TRIMESTER, UNSPECIFIED GRAVIDITY: Primary | ICD-10-CM

## 2023-01-18 PROBLEM — Z3A.25 25 WEEKS GESTATION OF PREGNANCY: Status: ACTIVE | Noted: 2022-12-01

## 2023-01-18 LAB
GLUCOSE URINE, POC: NEGATIVE
PROTEIN UA: POSITIVE

## 2023-01-18 PROCEDURE — 81002 URINALYSIS NONAUTO W/O SCOPE: CPT | Performed by: ADVANCED PRACTICE MIDWIFE

## 2023-01-18 PROCEDURE — 99213 OFFICE O/P EST LOW 20 MIN: CPT | Performed by: ADVANCED PRACTICE MIDWIFE

## 2023-01-18 NOTE — PROGRESS NOTES
Allyn Eldridge is a 32 y.o. female 24w6d    [id number]    OB History    Para Term  AB Living   4 3 2 1   2   SAB IAB Ectopic Molar Multiple Live Births             2      # Outcome Date GA Lbr Maximiliano/2nd Weight Sex Delivery Anes PTL Lv   4 Current            3  22 29w5d  1 lb 15.2 oz (0.885 kg) M Vag-Spont EPI N FD   2 Term 17 39w3d  8 lb (3.63 kg) F Vag-Spont EPI N EVER   1 Term 10/19/14 39w3d  6 lb (2.722 kg) M Vag-Spont EPI N EVER         Mother's Prenatal Vitals  BP: 128/82  Weight: 229 lb 6.4 oz (104.1 kg)  Heart Rate: 98  Patient Position: Sitting  Alb/Glu  Albumin: Trace  Glucose: Negative  Prenatal Fetal Information  Fetal HR: 146    The patient was seen and evaluated. There was {gen pos ne} fetal movements. No contractions or leakage of fluid. Signs and symptoms of  labor as well as labor were reviewed. The Nuchal Translucency testing was reviewed and found to be {Desc; normal/:95899} A single marker MSAFP was reviewed and found to be {Desc; normal/:64826}. The patients anatomy ultrasound has been completed and reviewed with patient. TOP ST OH Reviewed. A 28 week lab panel was ordered. This includes a (HH, 1 hr GTT, U/A C&S). The patient is to complete this in the next two to four weeks. The S/S of Pre-Eclampsia were reviewed with the patient in detail. She is to report any of these if they occur. She currently {denies/complains:38983} any of these. The patient is RH {Desc; negative/positive:77555::\"negative\"} Rhogam Ordered {yes no:909032}    Assessment:  1. Allyn Eldridge is a 32 y.o. female  2.  [id number]  3. 24w6d    Patient Active Problem List    Diagnosis Date Noted    23 weeks gestation of pregnancy 2022     Priority: Medium    Fourth pregnancy 2022     Priority: Medium    Encounter for supervision of other normal pregnancy, second trimester 2022     Priority: Medium    Abnormal glucose measurement 2022     Priority: Medium History of stillbirth in currently pregnant patient 10/26/2022     Priority: Medium    Pregnancy in multigravida 10/26/2022     Priority: Medium        Diagnosis Orders   1. Encounter for supervision of normal pregnancy in second trimester, unspecified   POCT urine qual dipstick glucose    POCT urine qual dipstick protein            Plan:  The patient will return to the office for her next visit in *** weeks. See antepartum flow sheet.

## 2023-01-18 NOTE — PROGRESS NOTES
Patient alert and pleasant with no complaints. Here today for prenatal visit. Fetal heart tones obtained without difficulty. Urine for glucose negative and protein obtained with trace results. Directed to the lab to obtain ordered blood work. Discharge instructions have been discussed with the patient. Patient advised to call our office with any questions or concerns. Voiced understanding.

## 2023-01-20 NOTE — PROGRESS NOTES
Jessica Agee is a 32 y.o. female 18w2d    [id number], here for routine OB visit. Denies complaints/concerns, reports good fetal movement. OB History    Para Term  AB Living   4 3 2 1   2   SAB IAB Ectopic Molar Multiple Live Births             2      # Outcome Date GA Lbr Maximiliano/2nd Weight Sex Delivery Anes PTL Lv   4 Current            3  22 29w5d  1 lb 15.2 oz (0.885 kg) M Vag-Spont EPI N FD   2 Term 17 39w3d  8 lb (3.63 kg) F Vag-Spont EPI N EVER   1 Term 10/19/14 39w3d  6 lb (2.722 kg) M Vag-Spont EPI N EVER         Mother's Prenatal Vitals  BP: 128/82  Weight: 229 lb 6.4 oz (104.1 kg)  Heart Rate: 98  Patient Position: Sitting  Alb/Glu  Albumin: Trace  Glucose: Negative  Prenatal Fetal Information  Fundal Height (cm): 25 cm  Fetal HR: 146  Movement: Present  Presentation: Vertex    The patient was seen and evaluated. There was positive fetal movements. No contractions or leakage of fluid. Signs and symptoms of  labor as well as labor were reviewed. A 28 week lab panel was ordered for the patient to complete in 2-3 weeks. This includes a (HH, 1 hr GTT). The patient is to complete this in the next two to four weeks. The S/S of Pre-Eclampsia were reviewed with the patient in detail. She is to report any of these if they occur. She currently denies any of these. The patient is RH positive Rhogam Ordered no    Assessment:  1. Jessica Agee is a 32 y.o. female  2. [id number]  3. 25w1d  4. Discussed results from patient's MFM visit, will start bi-weekly BPP/NSTs at 28 weeks (due to history of stillbirth at 33 weeks), order placed. 5. 28-week labs ordered, patient instructed to complete in 2-4 weeks.      Patient Active Problem List    Diagnosis Date Noted    25 weeks gestation of pregnancy 2022     Priority: Medium    Fourth pregnancy 2022     Priority: Medium    Encounter for supervision of other normal pregnancy, second trimester 2022 Priority: Medium    Abnormal glucose measurement 2022     Priority: Medium    History of stillbirth in currently pregnant patient 10/26/2022     Priority: Medium    Pregnancy in multigravida 10/26/2022     Priority: Medium        Diagnosis Orders   1. Encounter for supervision of normal pregnancy in second trimester, unspecified   POCT urine qual dipstick glucose    POCT urine qual dipstick protein    Glucose tolerance, 1 hour    CBC with Auto Differential    Ambulatory referral to Maternal Fetal      2. History of stillbirth in pregnant patient in second trimester, antepartum  Ambulatory referral to Maternal Fetal            Plan:  The patient will return to the office for her next visit in 2 weeks. See antepartum flow sheet.

## 2023-01-30 ENCOUNTER — APPOINTMENT (OUTPATIENT)
Dept: GENERAL RADIOLOGY | Age: 28
End: 2023-01-30
Payer: COMMERCIAL

## 2023-01-30 ENCOUNTER — HOSPITAL ENCOUNTER (EMERGENCY)
Age: 28
Discharge: HOME | End: 2023-01-30
Attending: STUDENT IN AN ORGANIZED HEALTH CARE EDUCATION/TRAINING PROGRAM
Payer: COMMERCIAL

## 2023-01-30 VITALS
WEIGHT: 224 LBS | BODY MASS INDEX: 33.18 KG/M2 | RESPIRATION RATE: 17 BRPM | SYSTOLIC BLOOD PRESSURE: 118 MMHG | HEART RATE: 87 BPM | DIASTOLIC BLOOD PRESSURE: 64 MMHG | TEMPERATURE: 98.3 F | HEIGHT: 69 IN | OXYGEN SATURATION: 97 %

## 2023-01-30 DIAGNOSIS — S39.012A STRAIN OF LUMBAR REGION, INITIAL ENCOUNTER: Primary | ICD-10-CM

## 2023-01-30 PROCEDURE — 6370000000 HC RX 637 (ALT 250 FOR IP)

## 2023-01-30 PROCEDURE — 72100 X-RAY EXAM L-S SPINE 2/3 VWS: CPT

## 2023-01-30 PROCEDURE — 99283 EMERGENCY DEPT VISIT LOW MDM: CPT

## 2023-01-30 RX ORDER — ACETAMINOPHEN 500 MG
1000 TABLET ORAL ONCE
Status: COMPLETED | OUTPATIENT
Start: 2023-01-30 | End: 2023-01-30

## 2023-01-30 RX ADMIN — ACETAMINOPHEN 1000 MG: 500 TABLET ORAL at 10:57

## 2023-01-30 ASSESSMENT — PAIN DESCRIPTION - LOCATION: LOCATION: BACK

## 2023-01-30 ASSESSMENT — PAIN DESCRIPTION - PAIN TYPE: TYPE: ACUTE PAIN

## 2023-01-30 ASSESSMENT — PAIN DESCRIPTION - DESCRIPTORS: DESCRIPTORS: DISCOMFORT

## 2023-01-30 ASSESSMENT — ENCOUNTER SYMPTOMS
BACK PAIN: 1
SORE THROAT: 0
ABDOMINAL PAIN: 0
DIARRHEA: 0
CHOKING: 0
SHORTNESS OF BREATH: 0
VOMITING: 0
COUGH: 0
NAUSEA: 0
EYE PAIN: 0
PHOTOPHOBIA: 0
CONSTIPATION: 0
FACIAL SWELLING: 0

## 2023-01-30 ASSESSMENT — PAIN DESCRIPTION - FREQUENCY: FREQUENCY: CONTINUOUS

## 2023-01-30 ASSESSMENT — PAIN DESCRIPTION - ONSET: ONSET: ON-GOING

## 2023-01-30 ASSESSMENT — PAIN SCALES - GENERAL
PAINLEVEL_OUTOF10: 8
PAINLEVEL_OUTOF10: 8

## 2023-01-30 ASSESSMENT — PAIN - FUNCTIONAL ASSESSMENT: PAIN_FUNCTIONAL_ASSESSMENT: 0-10

## 2023-01-30 ASSESSMENT — PAIN DESCRIPTION - ORIENTATION: ORIENTATION: RIGHT;LOWER

## 2023-01-30 NOTE — DISCHARGE INSTRUCTIONS
Please return to the ED if symptoms worsen, persist, recur. Please take all your medications as prescribed. Please follow-up with PCP as discussed.

## 2023-01-30 NOTE — ED PROVIDER NOTES
[facility]  Department of Emergency Medicine     Written by: Monika Koo MD  Patient Name: Noel Flannery  Attending Provider: Freddie Mccarthy DO  Admit Date: 2023 10:30 AM  MRN: [id number]                   : 1995        Chief Complaint   Patient presents with    Back Pain     slip and fall on steps hit back, 27 wks preg. c/o right sided lower back pain. - Chief complaint    This is a 75-year-old female currently 27 weeks pregnant presents to the ED with complaints of right lower back pain and midline back pain after a fall that occurred earlier today, about an hour and half prior to arrival to the ED. The patient states that she was walking her house, and the floor was slippery edge of ice, she slipped and fell directly on her back. The patient was able to stand up after and walk appropriately. The patient states that she was concerned because she is pregnant, and wanted to be evaluated. The patient denies any nausea or vomiting. She did not syncopized patient did not hit her head. The patient denies any numbness or tingling or weakness in her bilateral lower extremities. The patient otherwise has no complaints. She is following with her OB for pregnancy at this time. Review of Systems   Constitutional:  Negative for appetite change, chills, diaphoresis and fever. HENT:  Negative for ear discharge, ear pain, facial swelling, sneezing and sore throat. Eyes:  Negative for photophobia and pain. Respiratory:  Negative for cough, choking and shortness of breath. Cardiovascular:  Negative for chest pain and palpitations. Gastrointestinal:  Negative for abdominal pain, constipation, diarrhea, nausea and vomiting. Genitourinary:  Negative for dysuria, enuresis, flank pain, frequency and hematuria. Musculoskeletal:  Positive for back pain. Negative for arthralgias, joint swelling, myalgias and neck pain. Skin:  Negative for pallor and rash. Neurological:  Negative for weakness, light-headedness and headaches. Physical Exam  Constitutional:       General: She is not in acute distress. Appearance: Normal appearance. She is not ill-appearing. HENT:      Head: Normocephalic and atraumatic. Nose: Nose normal. No congestion. Mouth/Throat:      Mouth: Mucous membranes are moist.      Pharynx: No posterior oropharyngeal erythema. Eyes:      General: No scleral icterus. Extraocular Movements: Extraocular movements intact. Pupils: Pupils are equal, round, and reactive to light. Cardiovascular:      Rate and Rhythm: Normal rate and regular rhythm. Pulses: Normal pulses. Heart sounds: Normal heart sounds. Pulmonary:      Effort: Pulmonary effort is normal. No respiratory distress. Breath sounds: Normal breath sounds. No stridor. No wheezing or rhonchi. Chest:      Chest wall: No tenderness. Abdominal:      General: Bowel sounds are normal. There is no distension. Palpations: Abdomen is soft. There is no mass. Tenderness: There is no abdominal tenderness. Musculoskeletal:         General: Tenderness present. No swelling or deformity. Normal range of motion. Cervical back: Normal range of motion. No rigidity or tenderness. Skin:     Capillary Refill: Capillary refill takes less than 2 seconds. Coloration: Skin is not jaundiced or pale. Findings: No erythema. Neurological:      General: No focal deficit present. Mental Status: She is alert and oriented to person, place, and time. Mental status is at baseline. Procedures       MDM  Number of Diagnoses or Management Options  Strain of lumbar region, initial encounter  Diagnosis management comments: This is a 78-year-old female, currently 27 weeks pregnant presenting to the ED with complaints of back pain after a fall when she slipped outside of her house.  The patient here in the ED is hemodynamically stable, and in no acute distress. They are calm, alert, oriented, and pleasant to speak with. The patient has clear lungs auscultation, no abnormal heart murmurs are heard. The patient has no abdominal pain or tenderness. She has full neurovascular function, with no motor or sensory deficits in the lower extremities. The patient has right sided lower back pain, and some midline discomfort to palpation. Discussed with the patient the difference with getting an x-ray and a CT scan. The patient states that she would not like a CT due to pregnancy, however is agreeable to x-ray. She is also given 1 g Tylenol for pain. Remainder of MDM will be in the ED course below. ED Course as of 01/30/23 2041   Mon Jan 30, 2023   1240 Fetal heart tones at 134. [AH]   1160 X-ray is normal, patient is feeling better. No concern for fracture. No dislocation of the lumbar spine. She feels much better after Tylenol pill. Patient is ready for discharge for follow-up. She is requesting 1 day off from work. She will receive work note. []      ED Course User Index  [AH] Nikki Aaron MD       Chart review: According to chart review, patient was seen on January 6, 2023 by Dr. George Petersen for follow-up on pregnancy. The patient had ultrasound done which revealed a single intrauterine gestation at appropriate age. She was noted to have previously noted choroid plexus cysts which have resolved. Patient is following up as needed. Social determinants: No social determinants at this time, plan pregnancy, not requiring any social work involved at this time.     Chronic conditions limiting care: Patient is currently pregnant, and would not like a CT scan, therefore unable to fully evaluate extent of damage to lumbar spine after fall, however unlikely due to negative x-ray and resolution of pain with Tylenol.    --------------------------------------------- PAST HISTORY ---------------------------------------------  Past Medical History: has a past medical history of  delivery. Past Surgical History:  has a past surgical history that includes Cholecystectomy. Social History:  reports that she has quit smoking. Her smoking use included cigarettes. She has never used smokeless tobacco. She reports that she does not currently use alcohol. She reports that she does not use drugs. Family History: family history includes Heart Attack in her father. The patients home medications have been reviewed. Allergies: Wasp venom, Penicillins, and Percocet [oxycodone-acetaminophen]    -------------------------------------------------- RESULTS -------------------------------------------------  Labs:  No results found for this visit on 23. Radiology:  XR LUMBAR SPINE (2-3 VIEWS)   Final Result   Unremarkable examination of the lumbar spine. Fetus in cephalic presentation. Medications:  Medications   acetaminophen (TYLENOL) tablet 1,000 mg (1,000 mg Oral Given 23 1057)       ------------------------- NURSING NOTES AND VITALS REVIEWED ---------------------------  Date / Time Roomed:  2023 10:30 AM  ED Bed Assignment:      The nursing notes within the ED encounter and vital signs as below have been reviewed. /64   Pulse 87   Temp 98.3 °F (36.8 °C)   Resp 17   Ht 5' 9\" (1.753 m)   Wt 224 lb (101.6 kg)   LMP 2022 (Approximate)   SpO2 97%   BMI 33.08 kg/m²     ------------------------------------------ PROGRESS NOTES ------------------------------------------  I have spoken with the patient and discussed todays results, in addition to providing specific details for the plan of care and counseling regarding the diagnosis and prognosis. Their questions are answered at this time and they are agreeable with the plan. I discussed at length with them reasons for immediate return here for re evaluation.  They will followup with primary care by calling their office tomorrow. --------------------------------- ADDITIONAL PROVIDER NOTES ---------------------------------  At this time the patient is without objective evidence of an acute process requiring hospitalization or inpatient management. They have remained hemodynamically stable throughout their entire ED visit and are stable for discharge with outpatient follow-up. The plan has been discussed in detail and they are aware of the specific conditions for emergent return, as well as the importance of follow-up. Discharge Medication List as of 1/30/2023  1:05 PM          Diagnosis:  1. Strain of lumbar region, initial encounter        Disposition:  Patient's disposition: Discharge to home  Patient's condition is stable. Patient was seen and evaluated by myself and my attending DO Larry. Assessment and Plan discussed with attending provider, please see attestation for final plan of care.      MD Gail Brooks MD  Resident  01/30/23 0331

## 2023-01-30 NOTE — Clinical Note
Cortez Bell was seen and treated in our emergency department on 1/30/2023. She may return to work on 01/31/2023. If you have any questions or concerns, please don't hesitate to call.       Lenore Moreira MD

## 2023-01-30 NOTE — ED NOTES
Radiology Procedure Waiver   Name: Peg Echeverria  : 1995  MRN: [id number]    Date:  23    Time: 11:18 AM EST    Benefits of immediately proceeding with Radiology exam(s) without pre-testing outweigh the risks or are not indicated as specified below and therefore the following is/are being waived:    [x] Pregnancy test   [] Patients LMP on-time and regular.   [] Patient had Tubal Ligation or has other Contraception Device. [] Patient  is Menopausal or Premenarcheal.    [] Patient had Full or Partial Hysterectomy. [] Protocol for Iodine allergy    [] MRI Questionnaire     [] BUN/Creatinine   [] Patient age w/no hx of renal dysfunction. [] Patient on Dialysis. [] Recent Normal Labs.   Electronically signed by Braden Cruz MD on 23 at 11:18 AM EST               Braden Cruz MD  Resident  [phone]

## 2023-02-01 ENCOUNTER — ROUTINE PRENATAL (OUTPATIENT)
Dept: OBGYN | Age: 28
End: 2023-02-01
Payer: COMMERCIAL

## 2023-02-01 VITALS
WEIGHT: 233.9 LBS | DIASTOLIC BLOOD PRESSURE: 89 MMHG | HEART RATE: 104 BPM | BODY MASS INDEX: 34.54 KG/M2 | SYSTOLIC BLOOD PRESSURE: 132 MMHG

## 2023-02-01 DIAGNOSIS — Z34.92 ENCOUNTER FOR SUPERVISION OF NORMAL PREGNANCY IN SECOND TRIMESTER, UNSPECIFIED GRAVIDITY: ICD-10-CM

## 2023-02-01 DIAGNOSIS — Z34.93 THIRD TRIMESTER PREGNANCY: Primary | ICD-10-CM

## 2023-02-01 PROBLEM — Z3A.26 26 WEEKS GESTATION OF PREGNANCY: Status: ACTIVE | Noted: 2022-12-01

## 2023-02-01 LAB
GLUCOSE URINE, POC: NEGATIVE
PROTEIN UA: NEGATIVE

## 2023-02-01 PROCEDURE — 81002 URINALYSIS NONAUTO W/O SCOPE: CPT | Performed by: ADVANCED PRACTICE MIDWIFE

## 2023-02-01 PROCEDURE — 99213 OFFICE O/P EST LOW 20 MIN: CPT | Performed by: ADVANCED PRACTICE MIDWIFE

## 2023-02-01 NOTE — PROGRESS NOTES
Karina Hodges is a 27 y.o. female 26w6d    Has NST and BPP scheduled beginning next week, her appointments are on: the  &         OB History    Para Term  AB Living   4 3 2 1   2   SAB IAB Ectopic Molar Multiple Live Births             2      # Outcome Date GA Lbr Maximiliano/2nd Weight Sex Delivery Anes PTL Lv   4 Current            3  22 29w5d  1 lb 15.2 oz (0.885 kg) M Vag-Spont EPI N FD   2 Term 17 39w3d  8 lb (3.63 kg) F Vag-Spont EPI N EVER   1 Term 10/19/14 39w3d  6 lb (2.722 kg) M Vag-Spont EPI N EVER         Mother's Prenatal Vitals  BP: 132/89  Weight: 233 lb 14.4 oz (106.1 kg)  Heart Rate: (!) 104  Patient Position: Sitting  Alb/Glu  Albumin: Negative  Glucose: Negative  Prenatal Fetal Information  Fetal HR: 144    The patient was seen and evaluated. There was positive fetal movements. No contractions or leakage of fluid. Signs and symptoms of  labor as well as labor were reviewed. A 28 week lab panel was ordered. This includes a (2-hour GTT (repeat due to not passing early 1-hour but passing 3-hour). The patient is to complete this in the next two weeks.    The S/S of Pre-Eclampsia were reviewed with the patient in detail. She is to report any of these if they occur. She currently denies any of these.    The patient is RH positive Rhogam Ordered no    Assessment:  1. Karina Hodges is a 27 y.o. female  2.   3. 26w6d  4. Has  testing scheduled, beginning on 2/10/2023. We discussed that she should be coming 2x per week, 1 NST/BPP and 1 NST only appointment  5. Will do repeat 2-hour gtt and CBC in the next couple of weeks.     Patient Active Problem List    Diagnosis Date Noted    25 weeks gestation of pregnancy 2022     Priority: Medium    Fourth pregnancy 2022     Priority: Medium    Encounter for supervision of other normal pregnancy, second trimester 2022     Priority: Medium    Abnormal glucose measurement  2022     Priority: Medium    History of stillbirth in currently pregnant patient 10/26/2022     Priority: Medium    Pregnancy in multigravida 10/26/2022     Priority: Medium        Diagnosis Orders   1. Encounter for supervision of normal pregnancy in second trimester, unspecified   POCT urine qual dipstick glucose    POCT urine qual dipstick protein            Plan:  The patient will return to the office for her next visit in 2 weeks. See antepartum flow sheet.

## 2023-02-01 NOTE — PROGRESS NOTES
Patient alert and pleasant. Went to the ED last week after falling down the steps but everything checked out ok. Here today for prenatal visit. Fetal heart tones obtained without difficulty. Urine for glucose and protein obtained with negative results. Llab reqs given and directed to lab to obtain ordered blood work. Discharge instructions have been discussed with the patient. Patient advised to call our office with any questions or concerns. Voiced understanding.

## 2023-02-01 NOTE — LETTER
[address]  Rosielon Liner  JOSEFAdvanced [facility] Hospital of Southern New MexicoDUONG [address] [address]  Phone: [phone]  Fax: [phone]    Usman [facility]        February 1, 2023     Patient: Tammy Mckenzie   YOB: 1995   Date of Visit: 2/1/2023       To Whom It May Concern: It is my medical opinion that Tammy Mckenzie may return to work on 2/1/2023 with the following restrictions: lifting/carrying not to exceed 50 lbs and no rolling, pulling or pushing over 200lbs. If you have any questions or concerns, please don't hesitate to call.     Sincerely,        NICOLE House CNM

## 2023-02-06 ENCOUNTER — HOSPITAL ENCOUNTER (OUTPATIENT)
Age: 28
Discharge: HOME | End: 2023-02-06
Payer: COMMERCIAL

## 2023-02-06 DIAGNOSIS — Z34.93 THIRD TRIMESTER PREGNANCY: ICD-10-CM

## 2023-02-06 LAB
BASOPHILS ABSOLUTE: 0.05 E9/L (ref 0–0.2)
BASOPHILS RELATIVE PERCENT: 0.5 % (ref 0–2)
EOSINOPHILS ABSOLUTE: 0.11 E9/L (ref 0.05–0.5)
EOSINOPHILS RELATIVE PERCENT: 1.2 % (ref 0–6)
GLUCOSE TOLERANCE TEST 1 HOUR: 183 MG/DL
GLUCOSE TOLERANCE TEST 2 HOUR: 165 MG/DL
GLUCOSE TOLERANCE TEST FASTING: 87 MG/DL
HCT VFR BLD CALC: 34 % (ref 34–48)
HEMOGLOBIN: 11.4 G/DL (ref 11.5–15.5)
IMMATURE GRANULOCYTES #: 0.05 E9/L
IMMATURE GRANULOCYTES %: 0.5 % (ref 0–5)
LYMPHOCYTES ABSOLUTE: 1.52 E9/L (ref 1.5–4)
LYMPHOCYTES RELATIVE PERCENT: 16.3 % (ref 20–42)
MCH RBC QN AUTO: 29.3 PG (ref 26–35)
MCHC RBC AUTO-ENTMCNC: 33.5 % (ref 32–34.5)
MCV RBC AUTO: 87.4 FL (ref 80–99.9)
MONOCYTES ABSOLUTE: 0.66 E9/L (ref 0.1–0.95)
MONOCYTES RELATIVE PERCENT: 7.1 % (ref 2–12)
NEUTROPHILS ABSOLUTE: 6.91 E9/L (ref 1.8–7.3)
NEUTROPHILS RELATIVE PERCENT: 74.4 % (ref 43–80)
PDW BLD-RTO: 12.8 FL (ref 11.5–15)
PLATELET # BLD: 272 E9/L (ref 130–450)
PMV BLD AUTO: 9.7 FL (ref 7–12)
RBC # BLD: 3.89 E12/L (ref 3.5–5.5)
WBC # BLD: 9.3 E9/L (ref 4.5–11.5)

## 2023-02-06 PROCEDURE — 85025 COMPLETE CBC W/AUTO DIFF WBC: CPT

## 2023-02-06 PROCEDURE — 36415 COLL VENOUS BLD VENIPUNCTURE: CPT

## 2023-02-06 PROCEDURE — 82951 GLUCOSE TOLERANCE TEST (GTT): CPT

## 2023-02-07 ENCOUNTER — ROUTINE PRENATAL (OUTPATIENT)
Dept: OBGYN CLINIC | Age: 28
End: 2023-02-07
Payer: COMMERCIAL

## 2023-02-07 VITALS
SYSTOLIC BLOOD PRESSURE: 137 MMHG | HEART RATE: 115 BPM | BODY MASS INDEX: 34.32 KG/M2 | DIASTOLIC BLOOD PRESSURE: 81 MMHG | WEIGHT: 232.38 LBS

## 2023-02-07 DIAGNOSIS — Z3A.27 27 WEEKS GESTATION OF PREGNANCY: ICD-10-CM

## 2023-02-07 DIAGNOSIS — R73.09 ABNORMAL GLUCOSE MEASUREMENT: Primary | ICD-10-CM

## 2023-02-07 LAB
GLUCOSE URINE, POC: NEGATIVE
PROTEIN UA: NEGATIVE

## 2023-02-07 PROCEDURE — 81002 URINALYSIS NONAUTO W/O SCOPE: CPT | Performed by: OBSTETRICS & GYNECOLOGY

## 2023-02-07 PROCEDURE — 99999 PR OFFICE/OUTPT VISIT,PROCEDURE ONLY: CPT | Performed by: OBSTETRICS & GYNECOLOGY

## 2023-02-07 PROCEDURE — 59025 FETAL NON-STRESS TEST: CPT | Performed by: OBSTETRICS & GYNECOLOGY

## 2023-02-07 PROCEDURE — 99213 OFFICE O/P EST LOW 20 MIN: CPT | Performed by: OBSTETRICS & GYNECOLOGY

## 2023-02-07 NOTE — PATIENT INSTRUCTIONS
Please arrive for your scheduled appointment at least 15 minutes early with your actual insurance card+ a photo ID. Also if you need any refills ordered or have questions, it may take up 48 hours to reply. Please allow ample time for your refills. Call me when you use last refill. Thank you for your cooperation. Call your primary obstetrician with bleeding, leaking of fluid, abdominal tenderness, headache, blurry vision, epigastric pain and increased urinary frequency. If you are experiencing an emergency and need immediate help, call 911 or go to go emergency room or labor and delivery. if you are sick, not feeling well or have an infectious process going on please reschedule your appointment by calling [phone]. Also if any family members are not feeling well, please do not bring them to your appointment. We appreciate your cooperation. We are doing this in order to protect our pregnant mothers+ their babies. if you are sick, not feeling well or have an infectious process going on please reschedule your appointment by calling [phone]. Also if any family members are not feeling well, please do not bring them to your appointment. We appreciate your cooperation. We are doing this in order to protect our pregnant mothers+ their babies.

## 2023-02-07 NOTE — PROGRESS NOTES
Pt here for NST  Pt had 2 hour GTT done yesterday  Pt denies any bleeding/cramping  Pt states good fetal movement   Pt thinks she has a sinus infection

## 2023-02-07 NOTE — PROGRESS NOTES
[address] FETAL MEDICINE  [address] [facility] [address] [phone]   [address] FETAL MEDICINE  [address]  [facility] [address]  Dept: [phone]  Loc: [phone]     2023    RE:  Amrita Nelson     : 1995   AGE: 32 y.o. Dear Dr. Robert Coronel,    Thank you for allowing me to see Amrita Nelson. As I'm sure you will recall, Amrita Nelson is a 32 y.o. [id number]'s last menstrual period was 2022 (approximate).  Estimated Date of Delivery: 23 at 27w5d seen in our office today for the following:    REASON FOR VISIT: NST    Patient Active Problem List    Diagnosis Date Noted    27 weeks gestation of pregnancy 2022    Fourth pregnancy 2022    Encounter for supervision of other normal pregnancy, second trimester 2022    Abnormal glucose measurement 2022    History of stillbirth in currently pregnant patient 10/26/2022    Pregnancy in multigravida 10/26/2022        PAST HISTORY:  OB History    Para Term  AB Living   4 3 2 1   2   SAB IAB Ectopic Molar Multiple Live Births             2      # Outcome Date GA Lbr Maximiliano/2nd Weight Sex Delivery Anes PTL Lv   4 Current            3  22 29w5d  1 lb 15.2 oz (0.885 kg) M Vag-Spont EPI N FD   2 Term 17 39w3d  8 lb (3.63 kg) F Vag-Spont EPI N EVER   1 Term 10/19/14 39w3d  6 lb (2.722 kg) M Vag-Spont EPI N EVER          MEDICAL:  Past Medical History:   Diagnosis Date     delivery         SURGICAL:  Past Surgical History:   Procedure Laterality Date    CHOLECYSTECTOMY         ALLERGIES:    Allergies   Allergen Reactions    Wasp Venom Swelling    Penicillins Hives    Percocet [Oxycodone-Acetaminophen]          MEDICATIONS:    Current Outpatient Medications   Medication Sig Dispense Refill    Prenatal Vit-Fe Fumarate-FA (PRENATAL 19 PO) Take by mouth       No current facility-administered medications for this visit. Social History     Socioeconomic History    Marital status:      Spouse name: None    Number of children: None    Years of education: None    Highest education level: None   Tobacco Use    Smoking status: Former     Types: Cigarettes    Smokeless tobacco: Never   Vaping Use    Vaping Use: Never used   Substance and Sexual Activity    Alcohol use: Not Currently     Comment: social    Drug use: Never    Sexual activity: Yes     Partners: Male          FAMILY MEDICAL HISTORY:   Family History   Problem Relation Age of Onset    Heart Attack Father               PHYSICAL EXAMINATION:  /81   Pulse (!) 115   Wt 232 lb 6 oz (105.4 kg)   LMP 07/28/2022 (Approximate)   Body mass index is 34.32 kg/m². Urine dipstick:  Results for POC orders placed in visit on 02/07/23   POCT urine qual dipstick protein   Result Value Ref Range    Protein, UA Negative Negative   POCT urine qual dipstick glucose   Result Value Ref Range    Glucose, UA POC negative         INTERPRETATION:   The NST was reactive        Discussion:  The results were shared with the patient. IMPRESSION:  1. Single intrauterine gestation at 27+ weeks with reactive NST. RECOMMENDATIONS:  Each of the recommendations were discussed with the patient:  1. Consider once a week biophysical profile with NST. 2. Additional testing is not indicated for today's visit. The patient is to continue to follow with you in your office for ongoing obstetric care. PLAN:    As noted above or sooner prn.     Sincerely,        Cira Lopez MD

## 2023-02-10 ENCOUNTER — TELEPHONE (OUTPATIENT)
Dept: OBGYN | Age: 28
End: 2023-02-10

## 2023-02-10 ENCOUNTER — ANCILLARY PROCEDURE (OUTPATIENT)
Dept: OBGYN CLINIC | Age: 28
End: 2023-02-10
Payer: COMMERCIAL

## 2023-02-10 ENCOUNTER — ROUTINE PRENATAL (OUTPATIENT)
Dept: OBGYN CLINIC | Age: 28
End: 2023-02-10
Payer: COMMERCIAL

## 2023-02-10 VITALS
DIASTOLIC BLOOD PRESSURE: 79 MMHG | WEIGHT: 233 LBS | SYSTOLIC BLOOD PRESSURE: 133 MMHG | BODY MASS INDEX: 34.41 KG/M2 | HEART RATE: 111 BPM

## 2023-02-10 DIAGNOSIS — O09.292 HISTORY OF STILLBIRTH IN PREGNANT PATIENT IN SECOND TRIMESTER, ANTEPARTUM: ICD-10-CM

## 2023-02-10 DIAGNOSIS — R73.09 ABNORMAL GLUCOSE MEASUREMENT: Primary | ICD-10-CM

## 2023-02-10 DIAGNOSIS — O24.419 GESTATIONAL DIABETES MELLITUS (GDM) AFFECTING PREGNANCY: Primary | ICD-10-CM

## 2023-02-10 PROBLEM — Z3A.28 28 WEEKS GESTATION OF PREGNANCY: Status: ACTIVE | Noted: 2022-12-01

## 2023-02-10 LAB
GLUCOSE URINE, POC: NORMAL
PROTEIN UA: NEGATIVE

## 2023-02-10 PROCEDURE — 76818 FETAL BIOPHYS PROFILE W/NST: CPT | Performed by: OBSTETRICS & GYNECOLOGY

## 2023-02-10 PROCEDURE — 76816 OB US FOLLOW-UP PER FETUS: CPT | Performed by: OBSTETRICS & GYNECOLOGY

## 2023-02-10 PROCEDURE — 81002 URINALYSIS NONAUTO W/O SCOPE: CPT | Performed by: OBSTETRICS & GYNECOLOGY

## 2023-02-10 RX ORDER — BLOOD-GLUCOSE METER
1 EACH MISCELLANEOUS DAILY
Qty: 1 KIT | Refills: 0 | Status: SHIPPED | OUTPATIENT
Start: 2023-02-10

## 2023-02-10 NOTE — TELEPHONE ENCOUNTER
Left message on vm that I would send in Rx for diabetic testing supplies to pharmacy, will also place referral for Diabetic Education to get her started with testing. I will try to call patient again in a little while, also left office number for her to call if needed.

## 2023-02-10 NOTE — PROGRESS NOTES
Patient is here today for bpp/nst. Denies any vaginal bleeding, cramping, or leakage of fluids. Patient reports good fetal movement.

## 2023-02-10 NOTE — PROGRESS NOTES
[address] FETAL MEDICINE  [address] [facility] [address]  Dept: [address] [facility] [address]: [phone]     2/10/2023    RE:  [place] Felix     : 1995   AGE: 32 y.o. Dear Dr. Maribel Gale,    Thank you for allowing me to provide prenatal consultation for [place] Felix. As I'm sure you will recall, [place] Felix is a 32 y.o. [id number]  Patient's last menstrual period was 2022 (approximate).  Estimated Date of Delivery: 23 at 28w1d seen in our office today for the following:    REASON FOR CONSULTATION:  Diabetic management  Growth ultrasound  Biophysical profile and NST    Patient Active Problem List    Diagnosis Date Noted    28 weeks gestation of pregnancy 2022    Fourth pregnancy 2022    Encounter for supervision of other normal pregnancy, second trimester 2022    Abnormal glucose measurement 2022    History of stillbirth in currently pregnant patient 10/26/2022    Pregnancy in multigravida 10/26/2022       PAST HISTORY:  OB History    Para Term  AB Living   4 3 2 1   2   SAB IAB Ectopic Molar Multiple Live Births             2      # Outcome Date GA Lbr Maximiliano/2nd Weight Sex Delivery Anes PTL Lv   4 Current            3  22 29w5d  1 lb 15.2 oz (0.885 kg) M Vag-Spont EPI N FD   2 Term 17 39w3d  8 lb (3.63 kg) F Vag-Spont EPI N EVER   1 Term 10/19/14 39w3d  6 lb (2.722 kg) M Vag-Spont EPI N EVER          MEDICAL:  Past Medical History:   Diagnosis Date     delivery         SURGICAL:  Past Surgical History:   Procedure Laterality Date    CHOLECYSTECTOMY         ALLERGIES:    Allergies   Allergen Reactions    Wasp Venom Swelling    Penicillins Hives    Percocet [Oxycodone-Acetaminophen]          MEDICATIONS:    Current Outpatient Medications   Medication Sig Dispense Refill    Prenatal Vit-Fe Fumarate-FA (PRENATAL 19 PO) Take by mouth       No current facility-administered medications for this visit. Social History     Socioeconomic History    Marital status:    Tobacco Use    Smoking status: Former     Types: Cigarettes    Smokeless tobacco: Never   Vaping Use    Vaping Use: Never used   Substance and Sexual Activity    Alcohol use: Not Currently     Comment: social    Drug use: Never    Sexual activity: Yes     Partners: Male          FAMILY MEDICAL HISTORY:   Family History   Problem Relation Age of Onset    Heart Attack Father           PHYSICAL EXAMINATION:  /79   Pulse (!) 111   Wt 233 lb (105.7 kg)   LMP 07/28/2022 (Approximate)   Body mass index is 34.41 kg/m². Urine dipstick:  Results for POC orders placed in visit on 02/10/23   POCT urine qual dipstick protein   Result Value Ref Range    Protein, UA Negative Negative   POCT urine qual dipstick glucose   Result Value Ref Range    Glucose, UA POC neg           Lab Work Review:  I reviewed all of the patients blood sugars. We discussed diet and dietary modifications. Karina's blood sugars are very well controlled. No changes need to be made. IMPRESSION:  Single intrauterine gestation at 28+ weeks with glucose intolerance and history of a stillbirth at 34 weeks gestation. There is been appropriate interval growth. The baby is at the 57th percentile. 1242 g 2 pounds 12 ounces. The baby is in a cephalic presentation. Fetal cardiac motion, fetal motion, and fetal tone was observed and appeared to be grossly normal.  The placenta is posterior. Amniotic fluid volume is normal.  Biophysical profile is 10 out of 10. RECOMMENDATIONS:  Continue weekly biophysical profiles with NSTs. Growth ultrasounds every 4 weeks. Delivery at 39 weeks gestation. Continue monitoring glucoses. Follow-up would be otherwise as clinically indicated.     Each of the recommendations were discussed with the patient:       PLAN:    As noted above or sooner aleenan.    Sincerely,        Maxim Arroyo MD    I spent 35 minutes of direct contact time with the patient of which greater than 50% of the time was used to  the patient, discuss complications and problems related to her pregnancy, or coordinating her care. I answered all of her questions to her satisfaction.

## 2023-02-14 ENCOUNTER — HOSPITAL ENCOUNTER (OUTPATIENT)
Dept: DIABETES SERVICES | Age: 28
Setting detail: THERAPIES SERIES
Discharge: HOME | End: 2023-02-14
Payer: COMMERCIAL

## 2023-02-14 VITALS — WEIGHT: 233 LBS | BODY MASS INDEX: 34.41 KG/M2

## 2023-02-14 PROCEDURE — G0108 DIAB MANAGE TRN  PER INDIV: HCPCS

## 2023-02-14 ASSESSMENT — PROBLEM AREAS IN DIABETES QUESTIONNAIRE (PAID)
COPING WITH COMPLICATIONS OF DIABETES: 0
FEELING THAT DIABETES IS TAKING UP TOO MUCH OF YOUR MENTAL AND PHYSICAL ENERGY EVERY DAY: 0
FEELING SCARED WHEN YOU THINK ABOUT LIVING WITH DIABETES: 0
PAID-5 TOTAL SCORE: 1
FEELING DEPRESSED WHEN YOU THINK ABOUT LIVING WITH DIABETES: 0
WORRYING ABOUT THE FUTURE AND THE POSSIBILITY OF SERIOUS COMPLICATIONS: 1

## 2023-02-14 NOTE — PROGRESS NOTES
Diabetes Self-Management Education Record    Participant Name: Radha Westbrook  Referring Provider: No primary care provider on file. Assessment/Evaluation Ratings:  1=Needs Instruction   4=Demonstrates Understanding/Competency  2=Needs Review   NC=Not Covered    3=Comprehends Key Points  N/A=Not Applicable  Topics/Learning Objectives Pre-session Assess Date:  Instructor initials/date  2/14/23 LUIS Instr. Date    Instructor initials/date  2/14/23 LUIS Follow-up Post- session Eval Comments   Diabetes disease process & Treatment process:   -Define type of diabetes in simple terms.  - Describe the ABCs of  diabetes management  -Identify own type of diabetes  -Identify lifestyle changes/treatment options  -other:  2 [x] All     []  []  []  []  []  4 2/14/23 LUIS  New dx GDM  28 weeks 5 days  Due 5/4/23   Developing strategies for Healthy coping/psychosocial issues:    -Describe feelings about living with diabetes  -Identify coping strategies and sources of stress  -Identify support needed & support network available  -Complete PAID-5 Diabetes questionnaire 2 [x] All     []  []  []    []  4 Date: 2/14/23  PAID-5 Score: 1  Confidence Score: 8           Prevention, detection & treatment of Chronic complications:    -Identify the prevention, detection and treatment for complications including immunizations, preventive eye, foot, dental and renal exams as indicated per the participant's duration of diabetes and health status.  -Define the natural course of diabetes and the relationship of blood glucose levels to long term complications of diabetes.   2 [] All     []            []      Prevention, detection & treatment of acute complications:    -State the causes,signs & symptoms of hyper & hypoglycemia, and prevention & treatment strategies.   -Describe sick day guidelines  DKA /indications for ketone testing &  when to call physician  2 [] All     []      []                  -Identify severe weather/situation crisis  & diabetes supplies management 2 []      Using medications safely:   -State effects of diabetes medicines on blood glucose levels;  -List diabetes medication taken, action & side effects 2 [] All     []  []      Insulin/Injectables/glucagon  -Name appropriate injection sites; proper storage; supplies needed;  2   []       Demonstrate proper technique 2 []      Monitoring blood glucose, interpreting and using results:   -Identify the purpose of testing   -Identify recommended & personal blood glucose targets & HgbA1C target levels  -State the Importance of logging blood glucose levels for pattern recognition;   -State benefits of reading/using pt generated health data  -Verbalize safe lancet disposal 2 [x] All     []  []    []  []  []  4 2/14/23   Patient has been monitoring BG 4x a day for past 5 days. Fastings all in range besides one reading (96) all 2 hour PP readings under 120.   -Demonstrate proper testing technique 2 []      Incorporating physical activity into lifestyle:   -State effect of exercise on blood glucose levels;   -State benefits of regular exercise;   -Define safety considerations/food choices if needed.  -Describe contraindications/maintenance of activity. 2 [x] All     []  []    []  []  4 2/14/23   Patient walks daily. 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    Incorporating nutritional management into lifestyle:   -Describe effect of type, amount & timing of food on blood glucose  -Describe methods for preparing and planning   healthy meals  -Correctly read food labels for nutritional values  -Name 3 foods high in Carbohydrate  -Plan a sample 4 carbohydrate-controlled meal using Diabetes Plate Method  -Verbalized ability to measure and count carbohydrate gram servings using food labels and carbohydrate food list.    -Plan a carbohydrate-controlled meal based on individual needs/preferences from a Registered Dietitian.   2 [x] All []    []    []  []      []        []  4 2/14/23   Patient was trying to eat 3 times a day, attempting to cut out fruit. Does not eat many vegetables, trying to eat more of these. Reviewed nutritional recommendations for pregnancy. Reviewed which foods contain carbohydrates, how carbs impact blood glucose, how to count carbs, and how to spread carbs evenly throughout the day. Label reading reviewed. Patient very attentive to education and demonstrated understanding. Asked questions and took notes. 1900 calorie carbohydrate consistent meal plan reviewed with patient. Patient able to plan meals and snacks appropriately. Developing strategies for problem solving to promote health/change behavior. -Identify 7 self-care behaviors; Personal health risk factors; Benefits, challenges & strategies for behavioral change and set an individualized goal selection.  2       [x]  4 2/14/23   [x]Nutrition  []Monitoring  []Exercise  []Medication  []Other     Identified Barriers to learning/adherence to self management plan:    None  []  other    Instruction Method:  Lecture/Discussion and Handouts    Supporting Education Materials/Equipment Provided: Meal Plan and Gestational Pathway Booklet   []Luxembourger materials       [] services     []Other:      Encounter Type Date Attended Start Time End Time Comments No Show Dates   Assessment 2/14/23          Session 1         Session 2        1:1 DSMES          In person Follow-up         Gestational Diabetes 2/14/23  0845 0915      DSMES #3        Meter Instrx        Insulin Instrx           Additional Comments:     Date:   Follow-up goal attainment based on patients initial DSMES goal    Dr Notified by [] EMR []Fax        []Post class Hgb A1C  []Medication compliance   []Plate method/meal plan compliance   []Able to state the number of Carbohydrate servings eaten at B,L,D   []Testing blood glucose as prescribed by PCP   []Exercise Routine   []Other:   []Other:     []Patient lost to follow-up  Dr Notified by []EMR []Fax

## 2023-02-14 NOTE — LETTER
[facility])  - Diabetes Education    2023     Re:     Dorothy Hager  :  1995    Dear hCasity HIGGINS:            Thank you for referring your patient, Dorothy Hager, for diabetes education. Your patient has completed their personalized comprehensive diabetes education plan on the following topics: Disease Process, Healthy Eating, Exercise, Monitoring glucose, Acute/chronic complications, Lifestyle and healthy coping, Diabetes distress and support. The following services were also completed:    [x]  Carbohydrate controlled meal plan of 1900 calories. Upon completion of these sessions, the diabetes teaching team made the following evaluation of your patient's progress:          ASSESSMENT    [x]  Competent in all subject areas. [x]  answered questions appropriately when asked  [x]  seems able to apply concepts to daily lifestyle  [x]  seems motivated to do well  [x]  verbalized an understanding of meal plan  [x]  expresses an intent to comply with meal plan  [x]  worked out meal timing adjustment according to work/schedule/lifestyle    COMMENTS:        Thank you for referring this patient to our program.  Please do not hesitate to call if you have any questions at ( (SEY or KUMAR) or [phone] ([address] [place] [address]).         Sincerely,    Tera Kinney MS, RD, LD    [facility] Diabetes Education Department  American Diabetes Association Recognized DSMES Program

## 2023-02-15 ENCOUNTER — ROUTINE PRENATAL (OUTPATIENT)
Dept: OBGYN | Age: 28
End: 2023-02-15
Payer: COMMERCIAL

## 2023-02-15 VITALS
SYSTOLIC BLOOD PRESSURE: 127 MMHG | BODY MASS INDEX: 34.11 KG/M2 | HEART RATE: 95 BPM | DIASTOLIC BLOOD PRESSURE: 80 MMHG | WEIGHT: 231 LBS

## 2023-02-15 DIAGNOSIS — O24.410 DIET CONTROLLED GESTATIONAL DIABETES MELLITUS (GDM) IN THIRD TRIMESTER: ICD-10-CM

## 2023-02-15 DIAGNOSIS — Z34.93 ENCOUNTER FOR SUPERVISION OF NORMAL PREGNANCY IN THIRD TRIMESTER, UNSPECIFIED GRAVIDITY: Primary | ICD-10-CM

## 2023-02-15 LAB
GLUCOSE URINE, POC: NEGATIVE
PROTEIN UA: POSITIVE

## 2023-02-15 PROCEDURE — 81002 URINALYSIS NONAUTO W/O SCOPE: CPT | Performed by: ADVANCED PRACTICE MIDWIFE

## 2023-02-15 PROCEDURE — 90715 TDAP VACCINE 7 YRS/> IM: CPT | Performed by: ADVANCED PRACTICE MIDWIFE

## 2023-02-15 PROCEDURE — 99213 OFFICE O/P EST LOW 20 MIN: CPT | Performed by: ADVANCED PRACTICE MIDWIFE

## 2023-02-15 RX ORDER — BLOOD SUGAR DIAGNOSTIC
STRIP MISCELLANEOUS
COMMUNITY
Start: 2023-02-10

## 2023-02-15 RX ORDER — LANCETS 30 GAUGE
EACH MISCELLANEOUS
COMMUNITY
Start: 2023-02-10

## 2023-02-15 SDOH — ECONOMIC STABILITY: INCOME INSECURITY: HOW HARD IS IT FOR YOU TO PAY FOR THE VERY BASICS LIKE FOOD, HOUSING, MEDICAL CARE, AND HEATING?: NOT HARD AT ALL

## 2023-02-15 SDOH — ECONOMIC STABILITY: FOOD INSECURITY: WITHIN THE PAST 12 MONTHS, YOU WORRIED THAT YOUR FOOD WOULD RUN OUT BEFORE YOU GOT MONEY TO BUY MORE.: NEVER TRUE

## 2023-02-15 SDOH — ECONOMIC STABILITY: HOUSING INSECURITY
IN THE LAST 12 MONTHS, WAS THERE A TIME WHEN YOU DID NOT HAVE A STEADY PLACE TO SLEEP OR SLEPT IN A SHELTER (INCLUDING NOW)?: NO

## 2023-02-15 SDOH — ECONOMIC STABILITY: FOOD INSECURITY: WITHIN THE PAST 12 MONTHS, THE FOOD YOU BOUGHT JUST DIDN'T LAST AND YOU DIDN'T HAVE MONEY TO GET MORE.: NEVER TRUE

## 2023-02-15 NOTE — PROGRESS NOTES
Patient alert and pleasant with no complaints.  Here today for prenatal visit.  Fetal heart tones obtained without difficulty.  Urine for glucose negative and protein obtained with trace results.  Tdap given right deltoid. Tolerated well.  Discharge instructions have been discussed with the patient. Patient advised to call our office with any questions or concerns.   Voiced understanding.

## 2023-02-15 NOTE — PROGRESS NOTES
Vipul Mora is a 32 y.o. female 30w11d    [id number], states that she did see diabetes educator and thought it was very helpful and informative, states that she learned quite a bit about what foods she could eat and strategies. OB History    Para Term  AB Living   4 3 2 1   2   SAB IAB Ectopic Molar Multiple Live Births             2      # Outcome Date GA Lbr Maximiliano/2nd Weight Sex Delivery Anes PTL Lv   4 Current            3  22 29w5d  1 lb 15.2 oz (0.885 kg) M Vag-Spont EPI N FD   2 Term 17 39w3d  8 lb (3.63 kg) F Vag-Spont EPI N EVER   1 Term 10/19/14 39w3d  6 lb (2.722 kg) M Vag-Spont EPI N EVER         Mother's Prenatal Vitals  BP: 127/80  Weight: 231 lb (104.8 kg)  Heart Rate: 95  Patient Position: Sitting  Alb/Glu  Albumin: Trace  Glucose: Negative  Prenatal Fetal Information  Fetal HR: 143      The patient was seen and evaluated. There was positive fetal movements. No contractions or leakage of fluid. Signs and symptoms of  labor as well as labor were reviewed. The S/S of Pre-Eclampsia were reviewed with the patient in detail. She is to report any of these if they occur. She currently denies any of these. The patient had her 28 week labs completed. T-Dap Vaccine (27-36 weeks): awaiting      Assessment:  1. Vipul Mora is a 32 y.o. female  2. [id number]  3. 28w6d  4. Met with diabetes educator - lots of good info  5. Blood sugars have been good.    6. Tdap today    Patient Active Problem List    Diagnosis Date Noted    28 weeks gestation of pregnancy 2022     Priority: Medium    Fourth pregnancy 2022     Priority: Medium    Encounter for supervision of other normal pregnancy, second trimester 2022     Priority: Medium    Abnormal glucose measurement 2022     Priority: Medium    History of stillbirth in currently pregnant patient 10/26/2022     Priority: Medium    Pregnancy in multigravida 10/26/2022     Priority: Medium Diagnosis Orders   1. Encounter for supervision of normal pregnancy in third trimester, unspecified   POCT urine qual dipstick glucose    POCT urine qual dipstick protein                Plan:  The patient will return to the office for her next visit in 2 weeks. See antepartum flow sheet.

## 2023-02-16 ENCOUNTER — ANCILLARY PROCEDURE (OUTPATIENT)
Dept: OBGYN CLINIC | Age: 28
End: 2023-02-16
Payer: COMMERCIAL

## 2023-02-16 ENCOUNTER — ROUTINE PRENATAL (OUTPATIENT)
Dept: OBGYN CLINIC | Age: 28
End: 2023-02-16
Payer: COMMERCIAL

## 2023-02-16 VITALS
SYSTOLIC BLOOD PRESSURE: 122 MMHG | WEIGHT: 229.13 LBS | DIASTOLIC BLOOD PRESSURE: 76 MMHG | BODY MASS INDEX: 33.84 KG/M2 | HEART RATE: 89 BPM

## 2023-02-16 DIAGNOSIS — R73.09 ABNORMAL GLUCOSE MEASUREMENT: Primary | ICD-10-CM

## 2023-02-16 DIAGNOSIS — Z3A.29 29 WEEKS GESTATION OF PREGNANCY: ICD-10-CM

## 2023-02-16 LAB
GLUCOSE URINE, POC: NEGATIVE
PROTEIN UA: NEGATIVE

## 2023-02-16 PROCEDURE — 76820 UMBILICAL ARTERY ECHO: CPT | Performed by: OBSTETRICS & GYNECOLOGY

## 2023-02-16 PROCEDURE — 76815 OB US LIMITED FETUS(S): CPT | Performed by: OBSTETRICS & GYNECOLOGY

## 2023-02-16 PROCEDURE — 76821 MIDDLE CEREBRAL ARTERY ECHO: CPT | Performed by: OBSTETRICS & GYNECOLOGY

## 2023-02-16 PROCEDURE — 81002 URINALYSIS NONAUTO W/O SCOPE: CPT | Performed by: OBSTETRICS & GYNECOLOGY

## 2023-02-16 PROCEDURE — 99213 OFFICE O/P EST LOW 20 MIN: CPT | Performed by: OBSTETRICS & GYNECOLOGY

## 2023-02-16 PROCEDURE — 76818 FETAL BIOPHYS PROFILE W/NST: CPT | Performed by: OBSTETRICS & GYNECOLOGY

## 2023-02-16 NOTE — PROGRESS NOTES
Pt here for BPP/NST  Blood sugars scanned into media  Pt denies any bleeding/cramping  Pt states good fetal movement

## 2023-02-16 NOTE — LETTER
[facility]  [address] [place] [address].  [address] [place] [address]. [address]  Ph: [phone] Fax: [phone]                                                                               2023    RE: Kemal Jane   89   Dear       We saw  Ms. Alexandro Christiansen   for antepartum testing   in the office in [facility]  on  2023        SUMMARY: REASSURING EXAM TODAY. PRECAUTIONS REVIEWED  FOLLOWUP YOUR OFFICE. NEXT MFM APPT    1    weeks   OB History 26yo  4. Para 2 @29w 0d    Risk Factors History of Fetal Demise @ 29w5d  Declined NIPT at office  Elevated 1 Hour Glucose (normal 3 hour)  Choroid plexus cysts - resolved           ~Note - VSS- Afebrile - no complaints    DSUA neg proteinuria, neg glucosuria today     The patient had a nonstress test performed today which was reactive. There was moderate variability with accelerations     The patient had a detailed ultrasound performed today which was reassuring . A detailed report is included in the EMR under the imaging tab from today's date. 1.Active vertex female at 29w 0d.   2. Amniotic fluid appeared normal amount. 3. BPP score of 14/23.  4. Umbilical artery dopplers were within normal limits. 5.  MCA doppler shows MoM of 1.23.      Alexys Posey MD  Maternal Fetal Medicine    Followup  1 weeks  -  with NST  if mario /  undelivered

## 2023-02-21 NOTE — PROGRESS NOTES
[facility]  [address] [place] [address].  [address] [place], [place]. [address]  Ph: [phone] Fax: [phone]                                                                               2023    RE: Castillo Martines   89   Dear       We saw  Ms. Sherif Aparicio   for antepartum testing   in the office in [place], [place]  on  2023        SUMMARY: REASSURING EXAM TODAY. PRECAUTIONS REVIEWED  FOLLOWUP YOUR OFFICE. NEXT MFM APPT    1    weeks   OB History 26yo  4. Para 2 @29w 0d    Risk Factors History of Fetal Demise @ 29w5d  Declined NIPT at office  Elevated 1 Hour Glucose (normal 3 hour)  Choroid plexus cysts - resolved           ~Note - VSS- Afebrile - no complaints    DSUA neg proteinuria, neg glucosuria today     The patient had a nonstress test performed today which was reactive. There was moderate variability with accelerations     The patient had a detailed ultrasound performed today which was reassuring . A detailed report is included in the EMR under the imaging tab from today's date. 1.Active vertex female at 29w 0d.   2. Amniotic fluid appeared normal amount. 3. BPP score of 38/91.  4. Umbilical artery dopplers were within normal limits. 5.  MCA doppler shows MoM of 1.23.      Amado Alvarez MD  Maternal Fetal Medicine    Followup  1 weeks  -  with NST  if mario /  undelivered

## 2023-02-24 ENCOUNTER — ANCILLARY PROCEDURE (OUTPATIENT)
Dept: OBGYN CLINIC | Age: 28
End: 2023-02-24
Payer: COMMERCIAL

## 2023-02-24 ENCOUNTER — ROUTINE PRENATAL (OUTPATIENT)
Dept: OBGYN CLINIC | Age: 28
End: 2023-02-24
Payer: COMMERCIAL

## 2023-02-24 VITALS
WEIGHT: 229 LBS | SYSTOLIC BLOOD PRESSURE: 138 MMHG | BODY MASS INDEX: 33.82 KG/M2 | DIASTOLIC BLOOD PRESSURE: 81 MMHG | HEART RATE: 90 BPM

## 2023-02-24 DIAGNOSIS — R73.09 ABNORMAL GLUCOSE MEASUREMENT: Primary | ICD-10-CM

## 2023-02-24 DIAGNOSIS — Z3A.30 30 WEEKS GESTATION OF PREGNANCY: ICD-10-CM

## 2023-02-24 DIAGNOSIS — O24.410 DIET CONTROLLED GESTATIONAL DIABETES MELLITUS (GDM) IN THIRD TRIMESTER: ICD-10-CM

## 2023-02-24 DIAGNOSIS — O09.293 HISTORY OF STILLBIRTH IN PREGNANT PATIENT IN THIRD TRIMESTER, ANTEPARTUM: ICD-10-CM

## 2023-02-24 LAB
GLUCOSE URINE, POC: NEGATIVE
PROTEIN UA: NEGATIVE

## 2023-02-24 PROCEDURE — 76818 FETAL BIOPHYS PROFILE W/NST: CPT | Performed by: OBSTETRICS & GYNECOLOGY

## 2023-02-24 PROCEDURE — 81002 URINALYSIS NONAUTO W/O SCOPE: CPT | Performed by: OBSTETRICS & GYNECOLOGY

## 2023-02-24 PROCEDURE — 99213 OFFICE O/P EST LOW 20 MIN: CPT | Performed by: OBSTETRICS & GYNECOLOGY

## 2023-02-24 NOTE — PROGRESS NOTES
[address]  FETAL MEDICINE  [address] [facility] [address] [phone]   [facility] [address] [facility]IC[facility]  [address]  [facility]   Dept: [address] [facility] [address]: [phone]     2023    RE:  Rosey Ramirez     : 1995   AGE: 25 y.o. Dear Dr. Jonathan Elkins,    Thank you for allowing me to see Rosey Ramirez. As I'm sure you will recall, Rosey Ramirez is a 25 y.o. [id number]'s last menstrual period was 2022 (approximate).  Estimated Date of Delivery: 23 at 30w1d seen in our office today for the following:    REASON FOR VISIT: BPP and NST    Patient Active Problem List    Diagnosis Date Noted    Diet controlled gestational diabetes mellitus (GDM) in third trimester 02/15/2023    30 weeks gestation of pregnancy 2022    Fourth pregnancy 2022    Encounter for supervision of other normal pregnancy, second trimester 2022    Abnormal glucose measurement 2022    History of stillbirth in currently pregnant patient 10/26/2022    Pregnancy in multigravida 10/26/2022        PAST HISTORY:  OB History    Para Term  AB Living   4 3 2 1   2   SAB IAB Ectopic Molar Multiple Live Births             2      # Outcome Date GA Lbr Maximiliano/2nd Weight Sex Delivery Anes PTL Lv   4 Current            3  22 29w5d  1 lb 15.2 oz (0.885 kg) M Vag-Spont EPI N FD   2 Term 17 39w3d  8 lb (3.63 kg) F Vag-Spont EPI N EVER   1 Term 10/19/14 39w3d  6 lb (2.722 kg) M Vag-Spont EPI N EVER          MEDICAL:  Past Medical History:   Diagnosis Date     delivery         SURGICAL:  Past Surgical History:   Procedure Laterality Date    CHOLECYSTECTOMY         ALLERGIES:    Allergies   Allergen Reactions    Wasp Venom Swelling    Penicillins Hives    Percocet [Oxycodone-Acetaminophen] MEDICATIONS:    Current Outpatient Medications   Medication Sig Dispense Refill    ONETOUCH VERIO strip       Lancets (ONETOUCH DELICA PLUS [id number]) MISC       Blood Glucose Monitoring Suppl (ONE TOUCH ULTRA 2) w/Device KIT 1 kit by Does not apply route daily 1 kit 0    Prenatal Vit-Fe Fumarate-FA (PRENATAL 19 PO) Take by mouth       No current facility-administered medications for this visit. Social History     Socioeconomic History    Marital status:      Spouse name: None    Number of children: None    Years of education: None    Highest education level: None   Tobacco Use    Smoking status: Former     Types: Cigarettes    Smokeless tobacco: Never   Vaping Use    Vaping Use: Never used   Substance and Sexual Activity    Alcohol use: Not Currently     Comment: social    Drug use: Never    Sexual activity: Yes     Partners: Male     Social Determinants of Health     Financial Resource Strain: Low Risk     Difficulty of Paying Living Expenses: Not hard at all   Food Insecurity: No Food Insecurity    Worried About Running Out of Food in the Last Year: Never true    Ran Out of Food in the Last Year: Never true   Transportation Needs: Unknown    Lack of Transportation (Non-Medical): No   Housing Stability: Unknown    Unstable Housing in the Last Year: No          FAMILY MEDICAL HISTORY:   Family History   Problem Relation Age of Onset    Heart Attack Father           PHYSICAL EXAMINATION:  /81   Pulse 90   Wt 229 lb (103.9 kg)   LMP 07/28/2022 (Approximate)   Body mass index is 33.82 kg/m². Urine dipstick:  Results for POC orders placed in visit on 02/24/23   POCT urine qual dipstick protein   Result Value Ref Range    Protein, UA Negative Negative   POCT urine qual dipstick glucose   Result Value Ref Range    Glucose, UA POC negative         IMPRESSION:  1. Single intrauterine gestation in a vertex presentation at 30+ weeks with history of a stillbirth.    2. The biophysical profile is 10 out of 10.  3. The amniotic fluid volume is normal and the placenta is posterior. 4.  We did note what appears to be an ovarian cyst in the baby. It is a simple thin-walled cyst.    RECOMMENDATIONS:  Each of the recommendations were discussed with the patient:  1. Continue weekly biophysical profiles with NSTs. 2.  Continue growth ultrasounds every 4 weeks. 3.  Delivery at 39 weeks gestation. 4.  Follow-up would be otherwise as clinically indicated. The patient is to continue to follow with you in your office for ongoing obstetric care. PLAN:    As noted above or sooner prn.     Sincerely,        Burt Ram MD

## 2023-03-01 ENCOUNTER — ROUTINE PRENATAL (OUTPATIENT)
Dept: OBGYN | Age: 28
End: 2023-03-01
Payer: COMMERCIAL

## 2023-03-01 VITALS
SYSTOLIC BLOOD PRESSURE: 121 MMHG | DIASTOLIC BLOOD PRESSURE: 74 MMHG | WEIGHT: 230.3 LBS | HEART RATE: 102 BPM | BODY MASS INDEX: 34.01 KG/M2

## 2023-03-01 DIAGNOSIS — Z34.93 ENCOUNTER FOR SUPERVISION OF NORMAL PREGNANCY IN THIRD TRIMESTER, UNSPECIFIED GRAVIDITY: ICD-10-CM

## 2023-03-01 DIAGNOSIS — O09.93 SUPERVISION OF HIGH RISK PREGNANCY IN THIRD TRIMESTER: Primary | ICD-10-CM

## 2023-03-01 LAB
GLUCOSE URINE, POC: NEGATIVE
PROTEIN UA: NEGATIVE

## 2023-03-01 PROCEDURE — 81002 URINALYSIS NONAUTO W/O SCOPE: CPT | Performed by: ADVANCED PRACTICE MIDWIFE

## 2023-03-01 PROCEDURE — 0502F SUBSEQUENT PRENATAL CARE: CPT | Performed by: ADVANCED PRACTICE MIDWIFE

## 2023-03-01 PROCEDURE — 99213 OFFICE O/P EST LOW 20 MIN: CPT | Performed by: ADVANCED PRACTICE MIDWIFE

## 2023-03-01 NOTE — PROGRESS NOTES
Patient alert and pleasant with no complaints. Wants to know if it is ok to take her zyrtec. Here today for prenatal visit. Fetal heart tones obtained without difficulty. Urine for glucose and protein obtained with negative results. Discharge instructions have been discussed with the patient. Patient advised to call our office with any questions or concerns. Voiced understanding.

## 2023-03-01 NOTE — PROGRESS NOTES
Cali Cummins is a 25 y.o. female 27w9d    [id number] - c/o leg cramps, otherwise feeling good    OB History    Para Term  AB Living   4 3 2 1   2   SAB IAB Ectopic Molar Multiple Live Births             2      # Outcome Date GA Lbr Maximiliano/2nd Weight Sex Delivery Anes PTL Lv   4 Current            3  22 29w5d  1 lb 15.2 oz (0.885 kg) M Vag-Spont EPI N FD   2 Term 17 39w3d  8 lb (3.63 kg) F Vag-Spont EPI N EVER   1 Term 10/19/14 39w3d  6 lb (2.722 kg) M Vag-Spont EPI N EVER         Mother's Prenatal Vitals  BP: 121/74  Weight: 230 lb 4.8 oz (104.5 kg)  Heart Rate: (!) 102  Patient Position: Sitting  Alb/Glu  Albumin: Negative  Glucose: Negative  Prenatal Fetal Information  Fundal Height (cm): 34 cm  Fetal HR: 147  Movement: Present  Presentation: Vertex      The patient was seen and evaluated. There was positive fetal movements. No contractions or leakage of fluid. Signs and symptoms of  labor as well as labor were reviewed. The S/S of Pre-Eclampsia were reviewed with the patient in detail. She is to report any of these if they occur. She currently denies any of these. The patient had her 28 week labs completed. Assessment:  1. aCli Cummins is a 25 y.o. female  2. [id number]  3. 30w6d  4. Leg cramps - states cannot take any form of magnesium as it makes her feel sick. Will try [employer] OTC (topical magnesium). 5. C/O seasonal allergies, asking if she can take Zyrtec, discussed ok to take  6. She continues being followed by [facility] for history of stillbirth and diet-controlled GDM, she reports blood sugars have been good.       Patient Active Problem List    Diagnosis Date Noted    Diet controlled gestational diabetes mellitus (GDM) in third trimester 02/15/2023     Priority: Medium    30 weeks gestation of pregnancy 2022     Priority: Medium    Fourth pregnancy 2022     Priority: Medium    Encounter for supervision of other normal pregnancy, second trimester 2022     Priority: Medium    Abnormal glucose measurement 2022     Priority: Medium    History of stillbirth in currently pregnant patient 10/26/2022     Priority: Medium    Pregnancy in multigravida 10/26/2022     Priority: Medium        Diagnosis Orders   1. Encounter for supervision of normal pregnancy in third trimester, unspecified   POCT urine qual dipstick glucose    POCT urine qual dipstick protein                Plan:  The patient will return to the office for her next visit in 2 weeks. See antepartum flow sheet.

## 2023-03-02 ENCOUNTER — ANCILLARY PROCEDURE (OUTPATIENT)
Dept: OBGYN CLINIC | Age: 28
End: 2023-03-02
Payer: COMMERCIAL

## 2023-03-02 ENCOUNTER — ROUTINE PRENATAL (OUTPATIENT)
Dept: OBGYN CLINIC | Age: 28
End: 2023-03-02
Payer: COMMERCIAL

## 2023-03-02 VITALS
DIASTOLIC BLOOD PRESSURE: 81 MMHG | WEIGHT: 230 LBS | SYSTOLIC BLOOD PRESSURE: 127 MMHG | HEART RATE: 96 BPM | BODY MASS INDEX: 33.97 KG/M2

## 2023-03-02 DIAGNOSIS — R73.09 ABNORMAL GLUCOSE MEASUREMENT: Primary | ICD-10-CM

## 2023-03-02 PROBLEM — Z3A.31 31 WEEKS GESTATION OF PREGNANCY: Status: ACTIVE | Noted: 2022-12-01

## 2023-03-02 LAB
GLUCOSE URINE, POC: NORMAL
PROTEIN UA: ABNORMAL

## 2023-03-02 PROCEDURE — 81002 URINALYSIS NONAUTO W/O SCOPE: CPT | Performed by: OBSTETRICS & GYNECOLOGY

## 2023-03-02 PROCEDURE — 76818 FETAL BIOPHYS PROFILE W/NST: CPT | Performed by: OBSTETRICS & GYNECOLOGY

## 2023-03-02 PROCEDURE — 99999 PR OFFICE/OUTPT VISIT,PROCEDURE ONLY: CPT | Performed by: OBSTETRICS & GYNECOLOGY

## 2023-03-02 PROCEDURE — 99213 OFFICE O/P EST LOW 20 MIN: CPT | Performed by: OBSTETRICS & GYNECOLOGY

## 2023-03-02 NOTE — PROGRESS NOTES
Patient is here today for f/u. Denies any vaginal bleeding, cramping, or leakage of fluids. Patient reports good fetal movement.

## 2023-03-02 NOTE — PROGRESS NOTES
[address]  FETAL MEDICINE  [address] [facility] [address] [phone]   [address] FETAL MEDICINE  [address] [facility] [address]  Dept: [phone]  Loc: [phone]     3/2/2023    RE:  Janice Culver     : 1995   AGE: 25 y.o. Dear Dr. Dylon Macario,    Thank you for allowing me to see Janice Culver. As I'm sure you will recall, Janice Culver is a 25 y.o. [id number]'s last menstrual period was 2022 (approximate).  Estimated Date of Delivery: 23 at 31w0d seen in our office today for the following:    REASON FOR VISIT: BPP and NST    Patient Active Problem List    Diagnosis Date Noted    Diet controlled gestational diabetes mellitus (GDM) in third trimester 02/15/2023    31 weeks gestation of pregnancy 2022    Fourth pregnancy 2022    Encounter for supervision of other normal pregnancy, second trimester 2022    Abnormal glucose measurement 2022    History of stillbirth in currently pregnant patient 10/26/2022    Pregnancy in multigravida 10/26/2022        PAST HISTORY:  OB History    Para Term  AB Living   4 3 2 1   2   SAB IAB Ectopic Molar Multiple Live Births             2      # Outcome Date GA Lbr Maximiliano/2nd Weight Sex Delivery Anes PTL Lv   4 Current            3  22 29w5d  1 lb 15.2 oz (0.885 kg) M Vag-Spont EPI N FD   2 Term 17 39w3d  8 lb (3.63 kg) F Vag-Spont EPI N EVER   1 Term 10/19/14 39w3d  6 lb (2.722 kg) M Vag-Spont EPI N EVER          MEDICAL:  Past Medical History:   Diagnosis Date     delivery         SURGICAL:  Past Surgical History:   Procedure Laterality Date    CHOLECYSTECTOMY         ALLERGIES:    Allergies   Allergen Reactions    Wasp Venom Swelling    Penicillins Hives    Percocet [Oxycodone-Acetaminophen] MEDICATIONS:    Current Outpatient Medications   Medication Sig Dispense Refill    ONETOUCH VERIO strip       Lancets (ONETOUCH DELICA PLUS [id number]) MISC       Blood Glucose Monitoring Suppl (ONE TOUCH ULTRA 2) w/Device KIT 1 kit by Does not apply route daily 1 kit 0    Prenatal Vit-Fe Fumarate-FA (PRENATAL 19 PO) Take by mouth       No current facility-administered medications for this visit. Social History     Socioeconomic History    Marital status:    Tobacco Use    Smoking status: Former     Types: Cigarettes    Smokeless tobacco: Never   Vaping Use    Vaping Use: Never used   Substance and Sexual Activity    Alcohol use: Not Currently     Comment: social    Drug use: Never    Sexual activity: Yes     Partners: Male     Social Determinants of Health     Financial Resource Strain: Low Risk     Difficulty of Paying Living Expenses: Not hard at all   Food Insecurity: No Food Insecurity    Worried About Running Out of Food in the Last Year: Never true    Ran Out of Food in the Last Year: Never true   Transportation Needs: Unknown    Lack of Transportation (Non-Medical): No   Housing Stability: Unknown    Unstable Housing in the Last Year: No          FAMILY MEDICAL HISTORY:   Family History   Problem Relation Age of Onset    Heart Attack Father           PHYSICAL EXAMINATION:  /81   Pulse 96   Wt 230 lb (104.3 kg)   LMP 07/28/2022 (Approximate)   Body mass index is 33.97 kg/m². Urine dipstick:  Results for POC orders placed in visit on 03/02/23   POCT urine qual dipstick protein   Result Value Ref Range    Protein, UA 1+ (A) Negative   POCT urine qual dipstick glucose   Result Value Ref Range    Glucose, UA POC neg         IMPRESSION:  1. Single intrauterine gestation in a vertex presentation at 31 weeks with a history of a prior demise. 2. The biophysical profile is 10 out of 10.  3. The amniotic fluid volume is normal and the placenta is posterior.   5 [facility] did not bring any blood sugars for me to evaluate. RECOMMENDATIONS:  Each of the recommendations were discussed with the patient:  1. Continue weekly biophysical profiles with NSTs. 2.  Continue growth ultrasounds every 4 weeks. 3.  Delivery at 39 weeks gestation. 4.  Follow-up would be otherwise as clinically indicated. The patient is to continue to follow with you in your office for ongoing obstetric care. PLAN:    As noted above or sooner prn.     Sincerely,        Evan Crane MD

## 2023-03-09 ENCOUNTER — ROUTINE PRENATAL (OUTPATIENT)
Dept: OBGYN CLINIC | Age: 28
End: 2023-03-09
Payer: COMMERCIAL

## 2023-03-09 ENCOUNTER — ANCILLARY PROCEDURE (OUTPATIENT)
Dept: OBGYN CLINIC | Age: 28
End: 2023-03-09
Payer: COMMERCIAL

## 2023-03-09 VITALS
SYSTOLIC BLOOD PRESSURE: 124 MMHG | WEIGHT: 233 LBS | BODY MASS INDEX: 34.41 KG/M2 | DIASTOLIC BLOOD PRESSURE: 80 MMHG | HEART RATE: 100 BPM

## 2023-03-09 DIAGNOSIS — R73.09 ABNORMAL GLUCOSE MEASUREMENT: Primary | ICD-10-CM

## 2023-03-09 DIAGNOSIS — Z3A.32 32 WEEKS GESTATION OF PREGNANCY: ICD-10-CM

## 2023-03-09 LAB
GLUCOSE URINE, POC: NEGATIVE
PROTEIN UA: NEGATIVE

## 2023-03-09 PROCEDURE — 81002 URINALYSIS NONAUTO W/O SCOPE: CPT | Performed by: OBSTETRICS & GYNECOLOGY

## 2023-03-09 PROCEDURE — 76818 FETAL BIOPHYS PROFILE W/NST: CPT | Performed by: OBSTETRICS & GYNECOLOGY

## 2023-03-09 PROCEDURE — 99213 OFFICE O/P EST LOW 20 MIN: CPT | Performed by: OBSTETRICS & GYNECOLOGY

## 2023-03-09 PROCEDURE — 99999 PR OFFICE/OUTPT VISIT,PROCEDURE ONLY: CPT | Performed by: OBSTETRICS & GYNECOLOGY

## 2023-03-09 PROCEDURE — 76816 OB US FOLLOW-UP PER FETUS: CPT | Performed by: OBSTETRICS & GYNECOLOGY

## 2023-03-09 NOTE — PROGRESS NOTES
[address] FETAL MEDICINE  [address]  [facility] [address]  Dept: [address] [facility] [address]: [phone]     3/9/2023    RE:  Caity Wood     : 1995   AGE: 25 y.o. Dear Dr. Marietta Bender,    Thank you for allowing me to see Caity Wood. As I'm sure you will recall, Caity Wood is a 25 y.o. [id number]'s last menstrual period was 2022 (approximate).  Estimated Date of Delivery: 23 at 32w0d seen in our office today for the following:    REASON FOR VISIT: Growth     Patient Active Problem List    Diagnosis Date Noted    Diet controlled gestational diabetes mellitus (GDM) in third trimester 02/15/2023    32 weeks gestation of pregnancy 2022    Fourth pregnancy 2022    Encounter for supervision of other normal pregnancy, second trimester 2022    Abnormal glucose measurement 2022    History of stillbirth in currently pregnant patient 10/26/2022    Pregnancy in multigravida 10/26/2022        PAST HISTORY:  OB History    Para Term  AB Living   4 3 2 1   2   SAB IAB Ectopic Molar Multiple Live Births             2      # Outcome Date GA Lbr Maximiliano/2nd Weight Sex Delivery Anes PTL Lv   4 Current            3  22 29w5d  1 lb 15.2 oz (0.885 kg) M Vag-Spont EPI N FD   2 Term 17 39w3d  8 lb (3.63 kg) F Vag-Spont EPI N EVER   1 Term 10/19/14 39w3d  6 lb (2.722 kg) M Vag-Spont EPI N EVER          MEDICAL:  Past Medical History:   Diagnosis Date     delivery         SURGICAL:  Past Surgical History:   Procedure Laterality Date    CHOLECYSTECTOMY         ALLERGIES:    Allergies   Allergen Reactions    Wasp Venom Swelling    Penicillins Hives    Percocet [Oxycodone-Acetaminophen]          MEDICATIONS:    Current Outpatient Medications   Medication Sig Dispense Refill    ONETOUCH VERIO strip       Lancets (Bee Schaffer) [address] [facility] Blood Glucose Monitoring Suppl (ONE TOUCH ULTRA 2) w/Device KIT 1 kit by Does not apply route daily 1 kit 0    Prenatal Vit-Fe Fumarate-FA (PRENATAL 19 PO) Take by mouth       No current facility-administered medications for this visit. Social History     Socioeconomic History    Marital status:      Spouse name: None    Number of children: None    Years of education: None    Highest education level: None   Tobacco Use    Smoking status: Former     Types: Cigarettes    Smokeless tobacco: Never   Vaping Use    Vaping Use: Never used   Substance and Sexual Activity    Alcohol use: Not Currently     Comment: social    Drug use: Never    Sexual activity: Yes     Partners: Male     Social Determinants of Health     Financial Resource Strain: Low Risk     Difficulty of Paying Living Expenses: Not hard at all   Food Insecurity: No Food Insecurity    Worried About Running Out of Food in the Last Year: Never true    Ran Out of Food in the Last Year: Never true   Transportation Needs: Unknown    Lack of Transportation (Non-Medical): No   Housing Stability: Unknown    Unstable Housing in the Last Year: No          FAMILY MEDICAL HISTORY:   Family History   Problem Relation Age of Onset    Heart Attack Father           PHYSICAL EXAMINATION:  /80   Pulse 100   Wt 233 lb (105.7 kg)   LMP 07/28/2022 (Approximate)   Body mass index is 34.41 kg/m². Urine dipstick:  Results for POC orders placed in visit on 03/09/23   POCT urine qual dipstick protein   Result Value Ref Range    Protein, UA Negative Negative   POCT urine qual dipstick glucose   Result Value Ref Range    Glucose, UA POC negative         A/an growth, BPP, NST ultrasound was done in our office today. Please refer to the enclosed copy of the ultrasound report for further information. Discussion:  There is a murphy fetus in a cephalic presentation.   Fetal cardiac motion, fetal motion, and fetal tone was observed and appeared to be grossly normal.  No obvious anomalies noted there is been appropriate interval growth. The previously noted fetal ovarian cyst is smaller today. The placenta is posterior fundal.  Amniotic fluid volume is normal.  Biophysical profile is 10 out of 10. IMPRESSION:  1. Single intrauterine gestation at 32+ weeks with gestational diabetes and a reassuring biophysical profile. 2.  No obvious fetal anomalies are noted. The fetus is in a cephalic presentation. 3.  The amniotic fluid volume is normal and the placenta is posterior fundal.    RECOMMENDATIONS:  Each of the recommendations were discussed with the patient:  1. Continue weekly biophysical profiles with NSTs. 2.  Continue growth ultrasounds every 4 weeks. 3.  Delivery at 39 weeks gestation. 4.  Follow-up would be otherwise as clinically indicated. The patient is to continue to follow with you in your office for ongoing obstetric care. PLAN:    As noted above or sooner prn.     Sincerely,        Gisela Medel MD

## 2023-03-09 NOTE — PATIENT INSTRUCTIONS

## 2023-03-15 ENCOUNTER — ROUTINE PRENATAL (OUTPATIENT)
Dept: OBGYN | Age: 28
End: 2023-03-15
Payer: COMMERCIAL

## 2023-03-15 VITALS
DIASTOLIC BLOOD PRESSURE: 81 MMHG | WEIGHT: 236.9 LBS | BODY MASS INDEX: 34.98 KG/M2 | HEART RATE: 100 BPM | SYSTOLIC BLOOD PRESSURE: 119 MMHG

## 2023-03-15 DIAGNOSIS — Z34.93 ENCOUNTER FOR SUPERVISION OF NORMAL PREGNANCY IN THIRD TRIMESTER, UNSPECIFIED GRAVIDITY: Primary | ICD-10-CM

## 2023-03-15 LAB
GLUCOSE URINE, POC: NEGATIVE
PROTEIN UA: NEGATIVE

## 2023-03-15 PROCEDURE — 0502F SUBSEQUENT PRENATAL CARE: CPT | Performed by: ADVANCED PRACTICE MIDWIFE

## 2023-03-15 PROCEDURE — 81002 URINALYSIS NONAUTO W/O SCOPE: CPT | Performed by: ADVANCED PRACTICE MIDWIFE

## 2023-03-15 PROCEDURE — 99213 OFFICE O/P EST LOW 20 MIN: CPT | Performed by: ADVANCED PRACTICE MIDWIFE

## 2023-03-15 NOTE — PROGRESS NOTES
Peg Echeverria is a 25 y.o. female 32w6d    [id number]    OB History    Para Term  AB Living   4 3 2 1   2   SAB IAB Ectopic Molar Multiple Live Births             2      # Outcome Date GA Lbr Maximiliano/2nd Weight Sex Delivery Anes PTL Lv   4 Current            3  22 29w5d  1 lb 15.2 oz (0.885 kg) M Vag-Spont EPI N FD   2 Term 17 39w3d  8 lb (3.63 kg) F Vag-Spont EPI N EVER   1 Term 10/19/14 39w3d  6 lb (2.722 kg) M Vag-Spont EPI N EVER         Mother's Prenatal Vitals  BP: 119/81  Weight: 236 lb 14.4 oz (107.5 kg)  Heart Rate: 100  Patient Position: Sitting  Alb/Glu  Albumin: Negative  Glucose: Negative  Prenatal Fetal Information  Fetal HR: 155  Movement: Present      The patient was seen and evaluated. There was positive fetal movements. No contractions or leakage of fluid. Signs and symptoms of  labor as well as labor were reviewed. The S/S of Pre-Eclampsia were reviewed with the patient in detail. She is to report any of these if they occur. She currently denies any of these. The patient had her 28 week labs completed, (gestational diabetic)    T-Dap Vaccine (27-36 weeks): completed    Assessment:  1. Peg Echeverria is a 25 y.o. female  2. [id number]  3. 32w6d  4. Continues bi-weekly visits with MFM with BPPs/NSTs    Patient Active Problem List    Diagnosis Date Noted    Diet controlled gestational diabetes mellitus (GDM) in third trimester 02/15/2023     Priority: Medium    32 weeks gestation of pregnancy 2022     Priority: Medium    Fourth pregnancy 2022     Priority: Medium    Encounter for supervision of other normal pregnancy, second trimester 2022     Priority: Medium    Abnormal glucose measurement 2022     Priority: Medium    History of stillbirth in currently pregnant patient 10/26/2022     Priority: Medium    Pregnancy in multigravida 10/26/2022     Priority: Medium        Diagnosis Orders   1.  Encounter for supervision of normal pregnancy in third trimester, unspecified   POCT urine qual dipstick glucose    POCT urine qual dipstick protein                Plan:  The patient will return to the office for her next visit in 2 weeks. See antepartum flow sheet.

## 2023-03-17 ENCOUNTER — ANCILLARY PROCEDURE (OUTPATIENT)
Dept: OBGYN CLINIC | Age: 28
End: 2023-03-17
Payer: COMMERCIAL

## 2023-03-17 ENCOUNTER — ROUTINE PRENATAL (OUTPATIENT)
Dept: OBGYN CLINIC | Age: 28
End: 2023-03-17
Payer: COMMERCIAL

## 2023-03-17 VITALS
WEIGHT: 238 LBS | HEART RATE: 102 BPM | BODY MASS INDEX: 35.15 KG/M2 | SYSTOLIC BLOOD PRESSURE: 128 MMHG | DIASTOLIC BLOOD PRESSURE: 81 MMHG

## 2023-03-17 DIAGNOSIS — Z3A.33 33 WEEKS GESTATION OF PREGNANCY: ICD-10-CM

## 2023-03-17 DIAGNOSIS — R73.09 ABNORMAL GLUCOSE MEASUREMENT: ICD-10-CM

## 2023-03-17 DIAGNOSIS — O24.410 DIET CONTROLLED GESTATIONAL DIABETES MELLITUS (GDM) IN THIRD TRIMESTER: Primary | ICD-10-CM

## 2023-03-17 DIAGNOSIS — O09.293 HISTORY OF STILLBIRTH IN PREGNANT PATIENT IN THIRD TRIMESTER, ANTEPARTUM: ICD-10-CM

## 2023-03-17 DIAGNOSIS — Z34.90 FOURTH PREGNANCY: ICD-10-CM

## 2023-03-17 LAB
GLUCOSE URINE, POC: NORMAL
PROTEIN UA: NEGATIVE

## 2023-03-17 PROCEDURE — 99213 OFFICE O/P EST LOW 20 MIN: CPT | Performed by: OBSTETRICS & GYNECOLOGY

## 2023-03-17 PROCEDURE — 76819 FETAL BIOPHYS PROFIL W/O NST: CPT | Performed by: OBSTETRICS & GYNECOLOGY

## 2023-03-17 PROCEDURE — 81002 URINALYSIS NONAUTO W/O SCOPE: CPT | Performed by: OBSTETRICS & GYNECOLOGY

## 2023-03-17 NOTE — PROGRESS NOTES
Patient is here today for bpp/nst. Denies any vaginal bleeding or leakage of fluids. Mild cramping. Patient reports good fetal movement.

## 2023-03-17 NOTE — PROGRESS NOTES
BUFFY Deluca [address] FETAL MEDICINE  [address] [facility]NAAl[facility] Hospital[facility] [address]  Dept: [phone]  Loc: [phone]     3/17/2023    RE:  Janki Fleming     : 1995   AGE: 25 y.o. Dear Dr. Woody Varela,    Thank you for allowing me to see Janki Fleming. As I'm sure you will recall, Janki Fleming is a 25 y.o. [id number]'s last menstrual period was 2022 (approximate).  Estimated Date of Delivery: 23 at 33w1d seen in our office today for the following:    REASON FOR VISIT: Biophysical profile and cervical length    Patient Active Problem List    Diagnosis Date Noted    Diet controlled gestational diabetes mellitus (GDM) in third trimester 02/15/2023    33 weeks gestation of pregnancy 2022    Fourth pregnancy 2022    Encounter for supervision of other normal pregnancy, second trimester 2022    Abnormal glucose measurement 2022    History of stillbirth in currently pregnant patient 10/26/2022    Pregnancy in multigravida 10/26/2022        PAST HISTORY:  OB History    Para Term  AB Living   4 3 2 1   2   SAB IAB Ectopic Molar Multiple Live Births             2      # Outcome Date GA Lbr Maximiliano/2nd Weight Sex Delivery Anes PTL Lv   4 Current            3  22 29w5d  1 lb 15.2 oz (0.885 kg) M Vag-Spont EPI N FD   2 Term 17 39w3d  8 lb (3.63 kg) F Vag-Spont EPI N EVER   1 Term 10/19/14 39w3d  6 lb (2.722 kg) M Vag-Spont EPI N EVER          MEDICAL:  Past Medical History:   Diagnosis Date     delivery         SURGICAL:  Past Surgical History:   Procedure Laterality Date    CHOLECYSTECTOMY         ALLERGIES:    Allergies   Allergen Reactions    Wasp Venom Swelling    Penicillins Hives    Percocet [Oxycodone-Acetaminophen]          MEDICATIONS:    Current Outpatient Medications   Medication Sig Dispense Refill ONETOUCH VERIO strip       Lancets (ONETOUCH DELICA PLUS [id number]) MISC       Blood Glucose Monitoring Suppl (ONE TOUCH ULTRA 2) w/Device KIT 1 kit by Does not apply route daily 1 kit 0    Prenatal Vit-Fe Fumarate-FA (PRENATAL 19 PO) Take by mouth       No current facility-administered medications for this visit. Social History     Socioeconomic History    Marital status:    Tobacco Use    Smoking status: Former     Types: Cigarettes    Smokeless tobacco: Never   Vaping Use    Vaping Use: Never used   Substance and Sexual Activity    Alcohol use: Not Currently     Comment: social    Drug use: Never    Sexual activity: Yes     Partners: Male     Social Determinants of Health     Financial Resource Strain: Low Risk     Difficulty of Paying Living Expenses: Not hard at all   Food Insecurity: No Food Insecurity    Worried About Running Out of Food in the Last Year: Never true    Ran Out of Food in the Last Year: Never true   Transportation Needs: Unknown    Lack of Transportation (Non-Medical): No   Housing Stability: Unknown    Unstable Housing in the Last Year: No          FAMILY MEDICAL HISTORY:   Family History   Problem Relation Age of Onset    Heart Attack Father           PHYSICAL EXAMINATION:  /81   Pulse (!) 102   Wt 238 lb (108 kg)   LMP 07/28/2022 (Approximate)   Body mass index is 35.15 kg/m². Urine dipstick:  Results for POC orders placed in visit on 03/17/23   POCT urine qual dipstick protein   Result Value Ref Range    Protein, UA Negative Negative   POCT urine qual dipstick glucose   Result Value Ref Range    Glucose, UA POC neg         IMPRESSION:  1. Single intrauterine gestation in a vertex presentation at 32+ weeks with occasional contraction and history of fetal demise. 2. The biophysical profile is 8 out of 8 . Unfortunately she brought her children with her and we were not able to complete the NST.   3. The amniotic fluid volume is normal and the placenta is fundal.  4.  Patient was worried because she was having some contractions and she felt that the baby dropped. We did check a cervical length cervix is more than 4 cm and there was no beaking or funneling so I reassured the patient she was at very low risk for labor. RECOMMENDATIONS:  Each of the recommendations were discussed with the patient:  1. Continue weekly biophysical profiles with NSTs. 2.  Continue growth ultrasounds every 4 weeks. 3.  Delivery at 39 weeks gestation. 4.  Follow-up would be otherwise as clinically indicated. The patient is to continue to follow with you in your office for ongoing obstetric care. PLAN:    As noted above or sooner prn.     Sincerely,        Sosa Iverson MD

## 2023-03-22 ENCOUNTER — FOLLOWUP TELEPHONE ENCOUNTER (OUTPATIENT)
Dept: DIABETES SERVICES | Age: 28
End: 2023-03-22

## 2023-03-22 NOTE — LETTER
[facility])  - Diabetes Education    3/22/2023     Re:     Sarwat Lindquist  :  1995    Dear GISELA Collins        Thank you for referring your patient, Sarwat Lindquist, for diabetes education. Your patient has completed their personalized comprehensive diabetes education plan on the following topics: Disease Process, Healthy Eating, Exercise, Monitoring glucose, Acute/chronic complications, Lifestyle and healthy coping, Diabetes distress and support. The following services were also completed:    Sarwat Lindquist education goal  Is Healthy Eating  Her outcome 5 weeks post education was met 100 % of the time. Thank you for referring this patient to our program.  Please do not hesitate to call if you have any questions at ( (SEY or KUMAR) or [phone] ([address] [place] [address]).         Sincerely,    []  Kj KAPOOR  []  ROCKY Vaughn  []  ROCKY Yao  []  Elyssa Smith MS, RDN, LD  [x]  ROCKY Peres  []  Flynn Aranda RDN [address] [place] [address] Diabetes Education Department  American Diabetes Association Recognized DSMES Program

## 2023-03-22 NOTE — PROGRESS NOTES
[]    []    []  []      []        []  4 2/14/23   Patient was trying to eat 3 times a day, attempting to cut out fruit. Does not eat many vegetables, trying to eat more of these. Reviewed nutritional recommendations for pregnancy. Reviewed which foods contain carbohydrates, how carbs impact blood glucose, how to count carbs, and how to spread carbs evenly throughout the day. Label reading reviewed. Patient very attentive to education and demonstrated understanding. Asked questions and took notes. 1900 calorie carbohydrate consistent meal plan reviewed with patient. Patient able to plan meals and snacks appropriately. Developing strategies for problem solving to promote health/change behavior. -Identify 7 self-care behaviors; Personal health risk factors; Benefits, challenges & strategies for behavioral change and set an individualized goal selection.  2       [x]  4 2/14/23   [x]Nutrition  []Monitoring  []Exercise  []Medication  []Other     Identified Barriers to learning/adherence to self management plan:    None  []  other    Instruction Method:  Lecture/Discussion and Handouts    Supporting Education Materials/Equipment Provided: Meal Plan and Gestational Pathway Booklet   []Citizen of [place] materials       [] services     []Other:      Encounter Type Date Attended Start Time End Time Comments No Show Dates   Assessment 2/14/23          Session 1         Session 2        1:1 DSMES          In person Follow-up         Gestational Diabetes 2/14/23  0845 0915      DSMES #3        Meter Instrx        Insulin Instrx           Additional Comments:     Date:   3/22/23   Follow-up goal attainment based on patients initial DSMES goal    Dr Notified by [x] EMR []Fax        []Post class Hgb A1C  []Medication compliance   []Plate method/meal plan compliance   [x]Able to state the number of Carbohydrate servings eaten at B,L,D   []Testing blood glucose as prescribed by PCP   []Exercise

## 2023-03-24 ENCOUNTER — ANCILLARY PROCEDURE (OUTPATIENT)
Dept: OBGYN CLINIC | Age: 28
End: 2023-03-24
Payer: COMMERCIAL

## 2023-03-24 ENCOUNTER — ROUTINE PRENATAL (OUTPATIENT)
Dept: OBGYN CLINIC | Age: 28
End: 2023-03-24
Payer: COMMERCIAL

## 2023-03-24 VITALS
SYSTOLIC BLOOD PRESSURE: 127 MMHG | BODY MASS INDEX: 35.29 KG/M2 | HEART RATE: 94 BPM | WEIGHT: 239 LBS | DIASTOLIC BLOOD PRESSURE: 79 MMHG

## 2023-03-24 DIAGNOSIS — Z3A.34 34 WEEKS GESTATION OF PREGNANCY: ICD-10-CM

## 2023-03-24 DIAGNOSIS — O24.410 DIET CONTROLLED GESTATIONAL DIABETES MELLITUS (GDM) IN THIRD TRIMESTER: Primary | ICD-10-CM

## 2023-03-24 LAB
GLUCOSE URINE, POC: NEGATIVE
PROTEIN UA: NEGATIVE

## 2023-03-24 PROCEDURE — 81002 URINALYSIS NONAUTO W/O SCOPE: CPT | Performed by: OBSTETRICS & GYNECOLOGY

## 2023-03-24 PROCEDURE — 76818 FETAL BIOPHYS PROFILE W/NST: CPT | Performed by: OBSTETRICS & GYNECOLOGY

## 2023-03-24 PROCEDURE — 99213 OFFICE O/P EST LOW 20 MIN: CPT | Performed by: OBSTETRICS & GYNECOLOGY

## 2023-03-24 NOTE — PROGRESS NOTES
Pt here for BPP/NST  Pt denies any bleeding/cramping  Pt states good fetal movement  Pt has blood sugars and states are good

## 2023-03-24 NOTE — PROGRESS NOTES
BUFFY Deluca [address] FETAL MEDICINE  [address]  [facility] [address]  Dept: [phone]  Loc: [phone]     3/24/2023    RE:  Jose Miller     : 1995   AGE: 25 y.o. Dear MS. Jamil Sawyer you for allowing me to see Jose Miller. As I'm sure you will recall, Jose Miller is a 25 y.o. [id number]'s last menstrual period was 2022 (approximate).  Estimated Date of Delivery: 23 at 34w1d seen in our office today for the following:    REASON FOR VISIT: BPP and NST    Patient Active Problem List    Diagnosis Date Noted    Diet controlled gestational diabetes mellitus (GDM) in third trimester 02/15/2023    34 weeks gestation of pregnancy 2022    Fourth pregnancy 2022    Encounter for supervision of other normal pregnancy, second trimester 2022    Abnormal glucose measurement 2022    History of stillbirth in currently pregnant patient 10/26/2022    Pregnancy in multigravida 10/26/2022        PAST HISTORY:  OB History    Para Term  AB Living   4 3 2 1   2   SAB IAB Ectopic Molar Multiple Live Births             2      # Outcome Date GA Lbr Maximiliano/2nd Weight Sex Delivery Anes PTL Lv   4 Current            3  22 29w5d  1 lb 15.2 oz (0.885 kg) M Vag-Spont EPI N FD   2 Term 17 39w3d  8 lb (3.63 kg) F Vag-Spont EPI N EVER   1 Term 10/19/14 39w3d  6 lb (2.722 kg) M Vag-Spont EPI N EVER          MEDICAL:  Past Medical History:   Diagnosis Date     delivery         SURGICAL:  Past Surgical History:   Procedure Laterality Date    CHOLECYSTECTOMY         ALLERGIES:    Allergies   Allergen Reactions    Wasp Venom Swelling    Penicillins Hives    Percocet [Oxycodone-Acetaminophen]          MEDICATIONS:    Current Outpatient Medications   Medication Sig Dispense Refill    ONETOUCH VERIO strip

## 2023-03-24 NOTE — PATIENT INSTRUCTIONS

## 2023-03-29 ENCOUNTER — ROUTINE PRENATAL (OUTPATIENT)
Dept: OBGYN | Age: 28
End: 2023-03-29
Payer: COMMERCIAL

## 2023-03-29 VITALS
SYSTOLIC BLOOD PRESSURE: 125 MMHG | BODY MASS INDEX: 35.06 KG/M2 | DIASTOLIC BLOOD PRESSURE: 80 MMHG | WEIGHT: 237.4 LBS | HEART RATE: 97 BPM

## 2023-03-29 DIAGNOSIS — Z34.93 ENCOUNTER FOR SUPERVISION OF NORMAL PREGNANCY IN THIRD TRIMESTER, UNSPECIFIED GRAVIDITY: Primary | ICD-10-CM

## 2023-03-29 LAB
GLUCOSE URINE, POC: NEGATIVE
PROTEIN UA: NEGATIVE

## 2023-03-29 PROCEDURE — 81002 URINALYSIS NONAUTO W/O SCOPE: CPT | Performed by: OBSTETRICS & GYNECOLOGY

## 2023-03-29 PROCEDURE — 0502F SUBSEQUENT PRENATAL CARE: CPT | Performed by: OBSTETRICS & GYNECOLOGY

## 2023-03-29 PROCEDURE — 99213 OFFICE O/P EST LOW 20 MIN: CPT | Performed by: OBSTETRICS & GYNECOLOGY

## 2023-03-29 NOTE — PROGRESS NOTES
No SS PIH. Meets with [facility] for history of stillbirth and diet-controlled diabetes. FM. No BLD, LOF.

## 2023-04-05 ENCOUNTER — ROUTINE PRENATAL (OUTPATIENT)
Dept: OBGYN | Age: 28
End: 2023-04-05
Payer: COMMERCIAL

## 2023-04-05 VITALS
WEIGHT: 237.2 LBS | SYSTOLIC BLOOD PRESSURE: 136 MMHG | DIASTOLIC BLOOD PRESSURE: 88 MMHG | BODY MASS INDEX: 35.03 KG/M2 | HEART RATE: 91 BPM

## 2023-04-05 DIAGNOSIS — O09.90 SUPERVISION OF HIGH RISK PREGNANCY, ANTEPARTUM: Primary | ICD-10-CM

## 2023-04-05 PROBLEM — R73.09 ABNORMAL GLUCOSE MEASUREMENT: Status: RESOLVED | Noted: 2022-11-03 | Resolved: 2023-04-05

## 2023-04-05 PROBLEM — Z34.82 ENCOUNTER FOR SUPERVISION OF OTHER NORMAL PREGNANCY, SECOND TRIMESTER: Status: RESOLVED | Noted: 2022-11-28 | Resolved: 2023-04-05

## 2023-04-05 PROBLEM — Z3A.34 34 WEEKS GESTATION OF PREGNANCY: Status: RESOLVED | Noted: 2022-12-01 | Resolved: 2023-04-05

## 2023-04-05 LAB
GLUCOSE URINE, POC: NEGATIVE
PROTEIN UA: POSITIVE

## 2023-04-05 PROCEDURE — 81002 URINALYSIS NONAUTO W/O SCOPE: CPT | Performed by: OBSTETRICS & GYNECOLOGY

## 2023-04-05 PROCEDURE — 0502F SUBSEQUENT PRENATAL CARE: CPT | Performed by: OBSTETRICS & GYNECOLOGY

## 2023-04-05 PROCEDURE — 99213 OFFICE O/P EST LOW 20 MIN: CPT | Performed by: OBSTETRICS & GYNECOLOGY

## 2023-04-05 NOTE — PROGRESS NOTES
Routine ob  +fm  Pt denies lof vag bleeding or contractions pt Needs GBS today and pt voiced lost mucous plug last week

## 2023-04-05 NOTE — PROGRESS NOTES
Continues with MFM. Recommend delivery at 39 weeks. Good FM. No BLD. No SS PIH. Reports sugars well controlled. GBS performed today.

## 2023-04-06 DIAGNOSIS — O09.90 SUPERVISION OF HIGH RISK PREGNANCY, ANTEPARTUM: ICD-10-CM

## 2023-04-07 ENCOUNTER — ANCILLARY PROCEDURE (OUTPATIENT)
Dept: OBGYN CLINIC | Age: 28
End: 2023-04-07
Payer: COMMERCIAL

## 2023-04-07 ENCOUNTER — ROUTINE PRENATAL (OUTPATIENT)
Dept: OBGYN CLINIC | Age: 28
End: 2023-04-07
Payer: COMMERCIAL

## 2023-04-07 VITALS
WEIGHT: 240.25 LBS | HEART RATE: 103 BPM | BODY MASS INDEX: 35.48 KG/M2 | SYSTOLIC BLOOD PRESSURE: 133 MMHG | DIASTOLIC BLOOD PRESSURE: 85 MMHG

## 2023-04-07 DIAGNOSIS — Z3A.36 36 WEEKS GESTATION OF PREGNANCY: ICD-10-CM

## 2023-04-07 DIAGNOSIS — O24.410 DIET CONTROLLED GESTATIONAL DIABETES MELLITUS (GDM) IN THIRD TRIMESTER: Primary | ICD-10-CM

## 2023-04-07 LAB
GLUCOSE URINE, POC: NEGATIVE
PROTEIN UA: POSITIVE

## 2023-04-07 PROCEDURE — 76818 FETAL BIOPHYS PROFILE W/NST: CPT | Performed by: OBSTETRICS & GYNECOLOGY

## 2023-04-07 PROCEDURE — 76816 OB US FOLLOW-UP PER FETUS: CPT | Performed by: OBSTETRICS & GYNECOLOGY

## 2023-04-07 PROCEDURE — 99213 OFFICE O/P EST LOW 20 MIN: CPT | Performed by: OBSTETRICS & GYNECOLOGY

## 2023-04-07 PROCEDURE — 81002 URINALYSIS NONAUTO W/O SCOPE: CPT | Performed by: OBSTETRICS & GYNECOLOGY

## 2023-04-07 NOTE — PATIENT INSTRUCTIONS

## 2023-04-07 NOTE — PROGRESS NOTES
Pt here for BPP/NST  Pt forgot blood sugars   Pt states good fetal movement  Pt denies any bleeding/cramping
13 ounces. The placenta is fundal.  Amniotic fluid volume is normal.  The biophysical profile is 10 out of 10. IMPRESSION:  1. Single intrauterine gestation at 36+ weeks with reassuring biophysical profile. 2.  No obvious fetal anomalies are noted. The fetus is in a vertex presentation. 3.  The amniotic fluid volume is normal and the placenta is fundal.    RECOMMENDATIONS:  Each of the recommendations were discussed with the patient:  1. Continue weekly biophysical profiles with NSTs. 2.  Delivery at 39 weeks gestation. 3.  Follow-up would be otherwise as clinically indicated. The patient is to continue to follow with you in your office for ongoing obstetric care. PLAN:    As noted above or sooner prn.     Sincerely,        Floresita Meyer MD

## 2023-04-10 LAB — GP B STREP SPEC QL CULT: NORMAL

## 2023-04-18 ENCOUNTER — HOSPITAL ENCOUNTER (INPATIENT)
Age: 28
LOS: 1 days | Discharge: HOME | End: 2023-04-19
Attending: OBSTETRICS & GYNECOLOGY | Admitting: STUDENT IN AN ORGANIZED HEALTH CARE EDUCATION/TRAINING PROGRAM
Payer: COMMERCIAL

## 2023-04-18 ENCOUNTER — ANESTHESIA EVENT (OUTPATIENT)
Dept: LABOR AND DELIVERY | Age: 28
End: 2023-04-18
Payer: COMMERCIAL

## 2023-04-18 ENCOUNTER — ANESTHESIA (OUTPATIENT)
Dept: LABOR AND DELIVERY | Age: 28
End: 2023-04-18
Payer: COMMERCIAL

## 2023-04-18 PROBLEM — Z37.9 NORMAL LABOR: Status: ACTIVE | Noted: 2023-04-18

## 2023-04-18 LAB
ABO + RH BLD: NORMAL
AMPHET UR QL SCN: NOT DETECTED
BARBITURATES UR QL SCN: NOT DETECTED
BENZODIAZ UR QL SCN: NOT DETECTED
BLD GP AB SCN SERPL QL: NORMAL
CANNABINOIDS UR QL SCN: NOT DETECTED
COCAINE UR QL SCN: NOT DETECTED
DRUG SCREEN COMMENT UR-IMP: NORMAL
ERYTHROCYTE [DISTWIDTH] IN BLOOD BY AUTOMATED COUNT: 13.2 FL (ref 11.5–15)
FENTANYL SCREEN, URINE: NOT DETECTED
HCT VFR BLD AUTO: 34.4 % (ref 34–48)
HGB BLD-MCNC: 11.1 G/DL (ref 11.5–15.5)
MCH RBC QN AUTO: 27 PG (ref 26–35)
MCHC RBC AUTO-ENTMCNC: 32.3 % (ref 32–34.5)
MCV RBC AUTO: 83.7 FL (ref 80–99.9)
METHADONE UR QL SCN: NOT DETECTED
OPIATES UR QL SCN: NOT DETECTED
OXYCODONE URINE: NOT DETECTED
PCP UR QL SCN: NOT DETECTED
PLATELET # BLD AUTO: 232 E9/L (ref 130–450)
PMV BLD AUTO: 10.8 FL (ref 7–12)
RBC # BLD AUTO: 4.11 E12/L (ref 3.5–5.5)
WBC # BLD: 10.7 E9/L (ref 4.5–11.5)

## 2023-04-18 PROCEDURE — 86900 BLOOD TYPING SEROLOGIC ABO: CPT

## 2023-04-18 PROCEDURE — 2500000003 HC RX 250 WO HCPCS: Performed by: ANESTHESIOLOGY

## 2023-04-18 PROCEDURE — 6360000002 HC RX W HCPCS: Performed by: NURSE PRACTITIONER

## 2023-04-18 PROCEDURE — 80307 DRUG TEST PRSMV CHEM ANLYZR: CPT

## 2023-04-18 PROCEDURE — 6360000002 HC RX W HCPCS: Performed by: NURSE ANESTHETIST, CERTIFIED REGISTERED

## 2023-04-18 PROCEDURE — 7200000001 HC VAGINAL DELIVERY

## 2023-04-18 PROCEDURE — 36415 COLL VENOUS BLD VENIPUNCTURE: CPT

## 2023-04-18 PROCEDURE — APPNB30 APP NON BILLABLE TIME 0-30 MINS: Performed by: NURSE PRACTITIONER

## 2023-04-18 PROCEDURE — 85027 COMPLETE CBC AUTOMATED: CPT

## 2023-04-18 PROCEDURE — 86901 BLOOD TYPING SEROLOGIC RH(D): CPT

## 2023-04-18 PROCEDURE — 6360000002 HC RX W HCPCS

## 2023-04-18 PROCEDURE — 59400 OBSTETRICAL CARE: CPT | Performed by: ADVANCED PRACTICE MIDWIFE

## 2023-04-18 PROCEDURE — 86850 RBC ANTIBODY SCREEN: CPT

## 2023-04-18 PROCEDURE — 1220000000 HC SEMI PRIVATE OB R&B

## 2023-04-18 PROCEDURE — 10907ZC DRAINAGE OF AMNIOTIC FLUID, THERAPEUTIC FROM PRODUCTS OF CONCEPTION, VIA NATURAL OR ARTIFICIAL OPENING: ICD-10-PCS | Performed by: ADVANCED PRACTICE MIDWIFE

## 2023-04-18 PROCEDURE — 6370000000 HC RX 637 (ALT 250 FOR IP): Performed by: STUDENT IN AN ORGANIZED HEALTH CARE EDUCATION/TRAINING PROGRAM

## 2023-04-18 RX ORDER — ONDANSETRON 2 MG/ML
4 INJECTION INTRAMUSCULAR; INTRAVENOUS EVERY 6 HOURS PRN
Status: DISCONTINUED | OUTPATIENT
Start: 2023-04-18 | End: 2023-04-18 | Stop reason: HOSPADM

## 2023-04-18 RX ORDER — FENTANYL CITRATE 50 UG/ML
INJECTION, SOLUTION INTRAMUSCULAR; INTRAVENOUS PRN
Status: DISCONTINUED | OUTPATIENT
Start: 2023-04-18 | End: 2023-04-18 | Stop reason: SDUPTHER

## 2023-04-18 RX ORDER — FERROUS SULFATE 325(65) MG
325 TABLET ORAL 2 TIMES DAILY WITH MEALS
Status: DISCONTINUED | OUTPATIENT
Start: 2023-04-18 | End: 2023-04-19 | Stop reason: HOSPADM

## 2023-04-18 RX ORDER — MODIFIED LANOLIN
OINTMENT (GRAM) TOPICAL PRN
Status: DISCONTINUED | OUTPATIENT
Start: 2023-04-18 | End: 2023-04-19 | Stop reason: HOSPADM

## 2023-04-18 RX ORDER — METHYLERGONOVINE MALEATE 0.2 MG/ML
200 INJECTION INTRAVENOUS PRN
Status: DISCONTINUED | OUTPATIENT
Start: 2023-04-18 | End: 2023-04-19 | Stop reason: HOSPADM

## 2023-04-18 RX ORDER — SODIUM CHLORIDE 0.9 % (FLUSH) 0.9 %
5-40 SYRINGE (ML) INJECTION PRN
Status: DISCONTINUED | OUTPATIENT
Start: 2023-04-18 | End: 2023-04-19 | Stop reason: HOSPADM

## 2023-04-18 RX ORDER — ONDANSETRON 4 MG/1
8 TABLET, ORALLY DISINTEGRATING ORAL EVERY 8 HOURS PRN
Status: DISCONTINUED | OUTPATIENT
Start: 2023-04-18 | End: 2023-04-19 | Stop reason: HOSPADM

## 2023-04-18 RX ORDER — DIPHENHYDRAMINE HYDROCHLORIDE 50 MG/ML
12.5 INJECTION INTRAMUSCULAR; INTRAVENOUS EVERY 6 HOURS PRN
Status: DISCONTINUED | OUTPATIENT
Start: 2023-04-18 | End: 2023-04-18 | Stop reason: HOSPADM

## 2023-04-18 RX ORDER — CARBOPROST TROMETHAMINE 250 UG/ML
250 INJECTION, SOLUTION INTRAMUSCULAR PRN
Status: DISCONTINUED | OUTPATIENT
Start: 2023-04-18 | End: 2023-04-19 | Stop reason: HOSPADM

## 2023-04-18 RX ORDER — SODIUM CHLORIDE 0.9 % (FLUSH) 0.9 %
5-40 SYRINGE (ML) INJECTION EVERY 12 HOURS SCHEDULED
Status: DISCONTINUED | OUTPATIENT
Start: 2023-04-18 | End: 2023-04-19 | Stop reason: HOSPADM

## 2023-04-18 RX ORDER — DIPHENHYDRAMINE HYDROCHLORIDE 50 MG/ML
INJECTION INTRAMUSCULAR; INTRAVENOUS
Status: COMPLETED
Start: 2023-04-18 | End: 2023-04-18

## 2023-04-18 RX ORDER — SODIUM CHLORIDE 9 MG/ML
INJECTION, SOLUTION INTRAVENOUS PRN
Status: DISCONTINUED | OUTPATIENT
Start: 2023-04-18 | End: 2023-04-19 | Stop reason: HOSPADM

## 2023-04-18 RX ORDER — DOCUSATE SODIUM 100 MG/1
100 CAPSULE, LIQUID FILLED ORAL 2 TIMES DAILY
Status: DISCONTINUED | OUTPATIENT
Start: 2023-04-18 | End: 2023-04-19 | Stop reason: HOSPADM

## 2023-04-18 RX ORDER — MISOPROSTOL 200 UG/1
800 TABLET ORAL PRN
Status: DISCONTINUED | OUTPATIENT
Start: 2023-04-18 | End: 2023-04-19 | Stop reason: HOSPADM

## 2023-04-18 RX ORDER — SODIUM CHLORIDE, SODIUM LACTATE, POTASSIUM CHLORIDE, CALCIUM CHLORIDE 600; 310; 30; 20 MG/100ML; MG/100ML; MG/100ML; MG/100ML
INJECTION, SOLUTION INTRAVENOUS CONTINUOUS
Status: DISCONTINUED | OUTPATIENT
Start: 2023-04-18 | End: 2023-04-19 | Stop reason: HOSPADM

## 2023-04-18 RX ORDER — FENTANYL CITRATE 50 UG/ML
INJECTION, SOLUTION INTRAMUSCULAR; INTRAVENOUS
Status: COMPLETED
Start: 2023-04-18 | End: 2023-04-18

## 2023-04-18 RX ORDER — ACETAMINOPHEN 650 MG
TABLET, EXTENDED RELEASE ORAL
Status: DISPENSED
Start: 2023-04-18 | End: 2023-04-18

## 2023-04-18 RX ORDER — SODIUM CHLORIDE, SODIUM LACTATE, POTASSIUM CHLORIDE, AND CALCIUM CHLORIDE .6; .31; .03; .02 G/100ML; G/100ML; G/100ML; G/100ML
1000 INJECTION, SOLUTION INTRAVENOUS PRN
Status: DISCONTINUED | OUTPATIENT
Start: 2023-04-18 | End: 2023-04-19 | Stop reason: HOSPADM

## 2023-04-18 RX ORDER — SODIUM CHLORIDE, SODIUM LACTATE, POTASSIUM CHLORIDE, AND CALCIUM CHLORIDE .6; .31; .03; .02 G/100ML; G/100ML; G/100ML; G/100ML
500 INJECTION, SOLUTION INTRAVENOUS PRN
Status: DISCONTINUED | OUTPATIENT
Start: 2023-04-18 | End: 2023-04-19 | Stop reason: HOSPADM

## 2023-04-18 RX ORDER — IBUPROFEN 600 MG/1
600 TABLET ORAL EVERY 8 HOURS SCHEDULED
Status: DISCONTINUED | OUTPATIENT
Start: 2023-04-18 | End: 2023-04-19 | Stop reason: HOSPADM

## 2023-04-18 RX ORDER — LIDOCAINE HYDROCHLORIDE 10 MG/ML
INJECTION, SOLUTION INFILTRATION; PERINEURAL
Status: DISPENSED
Start: 2023-04-18 | End: 2023-04-18

## 2023-04-18 RX ORDER — NALOXONE HYDROCHLORIDE 0.4 MG/ML
INJECTION, SOLUTION INTRAMUSCULAR; INTRAVENOUS; SUBCUTANEOUS PRN
Status: DISCONTINUED | OUTPATIENT
Start: 2023-04-18 | End: 2023-04-18 | Stop reason: HOSPADM

## 2023-04-18 RX ADMIN — DIPHENHYDRAMINE HYDROCHLORIDE 12.5 MG: 50 INJECTION, SOLUTION INTRAMUSCULAR; INTRAVENOUS at 08:48

## 2023-04-18 RX ADMIN — DIPHENHYDRAMINE HYDROCHLORIDE 12.5 MG: 50 INJECTION INTRAMUSCULAR; INTRAVENOUS at 08:48

## 2023-04-18 RX ADMIN — Medication 166.7 ML: at 10:20

## 2023-04-18 RX ADMIN — IBUPROFEN 600 MG: 600 TABLET, FILM COATED ORAL at 23:56

## 2023-04-18 RX ADMIN — IBUPROFEN 600 MG: 600 TABLET, FILM COATED ORAL at 16:00

## 2023-04-18 RX ADMIN — DOCUSATE SODIUM 100 MG: 100 CAPSULE, LIQUID FILLED ORAL at 23:56

## 2023-04-18 RX ADMIN — Medication: at 16:00

## 2023-04-18 RX ADMIN — FENTANYL CITRATE 25 MCG: 50 INJECTION, SOLUTION INTRAMUSCULAR; INTRAVENOUS at 08:30

## 2023-04-18 RX ADMIN — Medication 87.3 ML: at 10:31

## 2023-04-18 RX ADMIN — Medication 15 ML/HR: at 08:40

## 2023-04-18 ASSESSMENT — PAIN SCALES - GENERAL
PAINLEVEL_OUTOF10: 5
PAINLEVEL_OUTOF10: 5

## 2023-04-18 ASSESSMENT — PAIN DESCRIPTION - LOCATION
LOCATION: ABDOMEN
LOCATION: ABDOMEN;BACK

## 2023-04-18 ASSESSMENT — PAIN DESCRIPTION - DESCRIPTORS
DESCRIPTORS: ACHING;CRAMPING;SORE
DESCRIPTORS: CRAMPING

## 2023-04-18 ASSESSMENT — PAIN DESCRIPTION - ORIENTATION: ORIENTATION: LOWER

## 2023-04-18 ASSESSMENT — PAIN - FUNCTIONAL ASSESSMENT: PAIN_FUNCTIONAL_ASSESSMENT: ACTIVITIES ARE NOT PREVENTED

## 2023-04-18 NOTE — DISCHARGE INSTRUCTIONS
inability to cope, anxiety, lack of interest in the infant/family, eating and sleeping disturbances,     BLEEDING  Vaginal bleeding will decrease in amount over the next few weeks. It should be like the end of your period like a brownish discharge. No different odor or color than a regular period. You will notice that as your activity increases, your flow may increase. This is your body's way of telling you, you need to take things easier and rest more often. Abdominal cramping should not get any worse than it is now. Take your pain medications as needed for the next few days. BREAST CARE  For breastfeeding moms:  If you become engorged, feeding may be more difficult or painful for 1-2 days. You may find it helpful to hand express some milk so that the infant can latch on more easily. While breastfeeding, continue to take your prenatal vitamins as directed by your doctor or midwife. Only take medications verified as safe for breastfeeding. If you start any new medications other than the ones you have had in the hospital, contact your pediatrician to see if they are safe for breastfeeding. Wear a support bra 24/7. You can pump your milk after breast feeding and freeze milk for six months in the freezer. When you are ready to use it, thaw it out in the refrigerator and use in 24 hours. Label the containers made for breast milk with day and time of pumping. Establish breast feeding for 2 weeks before you pump breast milk to save. Use your lanolin ointment/cream on your nipples after baby nurses. You need not wipe it off when baby nurses again. There are nipple shields and disks for sore nipples. Babies should eat every two to three hours. Avoid gassy foods (cabbage, onions, saurkraut, baked beans, cauliflower, broccoli) and spicy [place] or [place] foods. They might give the baby gas or make your milk taste funny to baby.  But if you have a craving, eat the food and see if it affects the baby and

## 2023-04-18 NOTE — ANESTHESIA PRE PROCEDURE
GLUCOSE 165 10/31/2022 10:22 AM    GLUCOSE 93 07/27/2022 09:33 PM    CALCIUM 9.1 07/27/2022 09:33 PM       POC Tests: No results for input(s): POCGLU, POCNA, POCK, POCCL, POCBUN, POCHEMO, POCHCT in the last 72 hours. Coags: No results found for: PROTIME, INR, APTT    HCG (If Applicable):   Lab Results   Component Value Date    PREGTESTUR positive 10/26/2022        ABGs: No results found for: PHART, PO2ART, WSM8KUI, CXB1SWK, BEART, V6CYYCVU     Type & Screen (If Applicable):  No results found for: LABABO, LABRH    Drug/Infectious Status (If Applicable):  No results found for: HIV, HEPCAB    COVID-19 Screening (If Applicable): No results found for: COVID19        Anesthesia Evaluation  Patient summary reviewed and Nursing notes reviewed no history of anesthetic complications:   Airway: Mallampati: III  TM distance: >3 FB   Neck ROM: full  Comment: Tongue piercing  Mouth opening: > = 3 FB   Dental:    (+) partials  Comment: Upper partials, first 3, some in back    Pulmonary:Negative Pulmonary ROS and normal exam  breath sounds clear to auscultation                             Cardiovascular:Negative CV ROS  Exercise tolerance: good (>4 METS),            Beta Blocker:  Not on Beta Blocker         Neuro/Psych:   Negative Neuro/Psych ROS              GI/Hepatic/Renal:   (+) GERD: poorly controlled,           Endo/Other:    (+) Diabetes (gestational), . Abdominal:             Vascular: negative vascular ROS. Other Findings:           Anesthesia Plan      epidural, spinal and general     ASA 3             Anesthetic plan and risks discussed with patient. Plan discussed with CRNA and attending.                     Tabitha Ospina RN   4/18/2023

## 2023-04-18 NOTE — ANESTHESIA PROCEDURE NOTES
CSE Block    Patient location during procedure: OB  Reason for block: procedure for pain  Staffing  Performed: other anesthesia staff   Anesthesiologist: Kobi Tenorio MD  Resident/CRNA: Marcos Giordano APRN - CRNA  Other anesthesia staff: Sade Young RN  CSE  Patient position: sitting  Prep: ChloraPrep  Patient monitoring: continuous pulse ox and frequent blood pressure checks  Approach: midline  Provider prep: mask and sterile gloves  Spinal Needle  Needle type: pencil-tip   Needle gauge: 27 G  Needle length: 5 inch. Epidural Needle  Injection technique: LILLY air  Needle type: Tuohy   Needle gauge: 18 G  Needle length: 3.5 in  Needle insertion depth: 7 cm  Location: lumbar (1-5)  Catheter  Catheter type: end hole  Catheter size: 20g.   Catheter at skin depth: 15 cm  Test dose: negative  Assessment  Hemodynamics: stable  Preanesthetic Checklist  Completed: patient identified, IV checked, site marked, risks and benefits discussed, surgical/procedural consents, equipment checked, pre-op evaluation, timeout performed, anesthesia consent given, oxygen available and monitors applied/VS acknowledged

## 2023-04-18 NOTE — LACTATION NOTE
Multiparous mom breast fed for as long as one month. Mom admits to struggles with low milk supply. Discussed ways to increase milk supply. Mom stated baby had a good feeding after delivery. Discussed frequency and duration of breastfeeds and signs of adequate milk transfer. Encouraged mom to hand express drops of colostrum at the start of a  breastfeed. Recommended to avoid a pacifier for three weeks or until breastfeeding is well established. Mom has an electric breast pump for home. Went over breastfeeding resources. Encouraged mom to call us to observe baby during a breastfeed.

## 2023-04-18 NOTE — H&P
Department of Obstetrics and Gynecology  Nurse Practitioner Obstetrics History and Physical        CHIEF COMPLAINT:  Contractions/srom    HISTORY OF PRESENT ILLNESS:    The patient is a 25 y.o. female [id number], Patient's last menstrual period was 2022 (approximate). ,  at 37w5d. Pt presents to l&d with complaints lof and cramping. Pt denies vb or decreased fm. Gbs negative. Hx of  delivery and fetal demise at 33 weeks. Gestational dm   OB History          4    Para   3    Term   2       1    AB        Living   2         SAB        IAB        Ectopic        Molar        Multiple        Live Births   2                Estimated Due Date: Estimated Date of Delivery: 23    PRENATAL CARE:  gestational dm, previous pregnancy fetal demise    Complicated by:   Patient Active Problem List   Diagnosis Code    History of stillbirth in currently pregnant patient O65.65    Pregnancy in multigravida Z28.80    Fourth pregnancy Z34.90    Diet controlled gestational diabetes mellitus (GDM) in third trimester O24.410    36 weeks gestation of pregnancy Z3A.36    Normal labor O80, Z37.9       PAST OB HISTORY  OB History          4    Para   3    Term   2       1    AB        Living   2         SAB        IAB        Ectopic        Molar        Multiple        Live Births   2                Past Medical History:        Diagnosis Date     delivery      Past Surgical History:        Procedure Laterality Date    CHOLECYSTECTOMY       Social History:    TOBACCO:   reports that she has quit smoking. Her smoking use included cigarettes. She has never used smokeless tobacco.  ETOH:   reports that she does not currently use alcohol. DRUGS:   reports no history of drug use.   Family History:       Problem Relation Age of Onset    Heart Attack Father      Medications Prior to Admission:  Medications Prior to Admission: ONETOUCH VERIO strip,   Lancets ([address]) MISC,   Blood

## 2023-04-18 NOTE — PROCEDURES
Type and Rh: O POS        Rubella Immunity Status:   Immune           Infant Feeding:    both breast and bottle -     Attending Attestation: I was present and scrubbed for the entire procedure.     NICOLE Hawthorne CNM   4/18/2023 11:06 AM

## 2023-04-19 VITALS
OXYGEN SATURATION: 98 % | SYSTOLIC BLOOD PRESSURE: 140 MMHG | HEART RATE: 86 BPM | DIASTOLIC BLOOD PRESSURE: 77 MMHG | RESPIRATION RATE: 18 BRPM | TEMPERATURE: 98.3 F

## 2023-04-19 PROBLEM — Z3A.37 37 WEEKS GESTATION OF PREGNANCY: Status: ACTIVE | Noted: 2023-04-19

## 2023-04-19 PROBLEM — Z3A.36 36 WEEKS GESTATION OF PREGNANCY: Status: RESOLVED | Noted: 2023-04-07 | Resolved: 2023-04-19

## 2023-04-19 LAB
ERYTHROCYTE [DISTWIDTH] IN BLOOD BY AUTOMATED COUNT: 13.2 FL (ref 11.5–15)
HCT VFR BLD AUTO: 31.5 % (ref 34–48)
HGB BLD-MCNC: 10.3 G/DL (ref 11.5–15.5)
MCH RBC QN AUTO: 27.2 PG (ref 26–35)
MCHC RBC AUTO-ENTMCNC: 32.7 % (ref 32–34.5)
MCV RBC AUTO: 83.3 FL (ref 80–99.9)
PLATELET # BLD AUTO: 210 E9/L (ref 130–450)
PMV BLD AUTO: 11 FL (ref 7–12)
RBC # BLD AUTO: 3.78 E12/L (ref 3.5–5.5)
WBC # BLD: 9.9 E9/L (ref 4.5–11.5)

## 2023-04-19 PROCEDURE — 6370000000 HC RX 637 (ALT 250 FOR IP): Performed by: STUDENT IN AN ORGANIZED HEALTH CARE EDUCATION/TRAINING PROGRAM

## 2023-04-19 PROCEDURE — 99024 POSTOP FOLLOW-UP VISIT: CPT | Performed by: NURSE PRACTITIONER

## 2023-04-19 PROCEDURE — 36415 COLL VENOUS BLD VENIPUNCTURE: CPT

## 2023-04-19 PROCEDURE — 85027 COMPLETE CBC AUTOMATED: CPT

## 2023-04-19 PROCEDURE — 99231 SBSQ HOSP IP/OBS SF/LOW 25: CPT | Performed by: MIDWIFE

## 2023-04-19 RX ORDER — IBUPROFEN 600 MG/1
600 TABLET ORAL EVERY 8 HOURS SCHEDULED
Qty: 120 TABLET | Refills: 3 | Status: SHIPPED | OUTPATIENT
Start: 2023-04-19

## 2023-04-19 RX ADMIN — Medication: at 08:36

## 2023-04-19 RX ADMIN — IBUPROFEN 600 MG: 600 TABLET, FILM COATED ORAL at 08:36

## 2023-04-19 RX ADMIN — DOCUSATE SODIUM 100 MG: 100 CAPSULE, LIQUID FILLED ORAL at 08:36

## 2023-04-19 ASSESSMENT — PAIN - FUNCTIONAL ASSESSMENT: PAIN_FUNCTIONAL_ASSESSMENT: ACTIVITIES ARE NOT PREVENTED

## 2023-04-19 ASSESSMENT — PAIN DESCRIPTION - DESCRIPTORS: DESCRIPTORS: CRAMPING

## 2023-04-19 ASSESSMENT — PAIN SCALES - GENERAL: PAINLEVEL_OUTOF10: 5

## 2023-04-19 ASSESSMENT — PAIN DESCRIPTION - LOCATION: LOCATION: ABDOMEN

## 2023-04-19 NOTE — ANESTHESIA POSTPROCEDURE EVALUATION
Department of Anesthesiology  Postprocedure Note    Patient: Amber Gonzalez  MRN: [id number]  YOB: 1995  Date of evaluation: 4/19/2023      Procedure Summary     Date: 04/18/23 Room / Location:     Anesthesia Start: 0820 Anesthesia Stop: 1013    Procedure: Labor Analgesia Diagnosis:     Scheduled Providers:  Responsible Provider: Laura Velasco MD    Anesthesia Type: epidural, spinal, general ASA Status: 3          Anesthesia Type: No value filed.     Martin Phase I:      Martin Phase II:        Anesthesia Post Evaluation    Patient location during evaluation: floor  Patient participation: complete - patient participated  Level of consciousness: awake  Airway patency: patent  Nausea & Vomiting: no nausea and no vomiting  Complications: no  Cardiovascular status: hemodynamically stable  Respiratory status: acceptable  Hydration status: stable

## 2023-04-19 NOTE — LACTATION NOTE
Mom continues to breastfeed her baby with no complaints. Going home today. Encouraged her to call us with questions or concerns.

## 2023-04-19 NOTE — PROGRESS NOTES
A3E9(4) 37w5d presents to unit with c\o ctx since last night. Pt states she felt wet on the way in to the hospital. Pt states she is a diet controlled GDM. + FM. Pt placed on EFM. Call light within reach.  Amnisure pending
Admitted from L&D to 314. Transferred from wheelchair to bed without difficulty. Assessment as charted. Vaginal bleeding RNA. Ice to perineum. Ate lunch. Voided in L&D prior to transfer. Oriented to room and unit. Comfortable. FOB @ bedside.
Assessment as charted. Mom comfortable.
Assisted up to bathroom. Voided large amount without difficulty. Vaginal bleeding RNA. Instructed on elmira care. Using ice to perineum.
Assume patient care- report from [facility].
CLINICAL PHARMACY NOTE: MEDS TO BEDS    Total # of Prescriptions Filled: 1   The following medications were delivered to the patient:  Ibuprofen 600 mg    Additional Documentation:
Delivery of viable infant female at 12. 9/9 apgars
Discharge teaching done. Instructions given. Mom verbalizes understanding. RX for Motrin filled and delivered by in house pharmacy. Discharged via wheelchair in stable condition.
Epidural removed, blue tip intact.
L&D PROGRESS NOTE:    Patient:  Sunil Sales     Admit Date:  4/18/2023  5:46 AM  Medical Record Number:  [id number]   Today's Date: 4/18/2023    S: Dr requesting AROM. O:   Vitals:    04/18/23 0849 04/18/23 0909 04/18/23 0910 04/18/23 0930   BP: 124/65 123/71 123/71 138/80   Pulse: 88 80 80 80   Resp:       Temp:       TempSrc:       SpO2:         FHR:  Reassuring. Cervix: 8-9 / 90 / soft / -1. TOCO:  4 minutes-.    A:28 y.o.  female at 37w6d [id number]  Term pregnancy and Spontaneous labor gestational diabetes active phase labor   Patient Active Problem List   Diagnosis    History of stillbirth in currently pregnant patient    Pregnancy in multigravida    Fourth pregnancy    Diet controlled gestational diabetes mellitus (GDM) in third trimester    36 weeks gestation of pregnancy    Normal labor     Fetal status: Reassuring  GBS: negative    P: AROM without difficulty using the [place]. Ruptured clear fluid.   IV bolus/O2/position changes prn  See orders per Dr. Ritika Rogers, APRN - CN,  4/18/2023 9:50 AM
Notified Francois of patient unchanged SVE and FHR tracing. No new orders at this time.
Patient ambulated to bathroom, voided and elmira care provided. Patient tolerated well. Patient transferred to [facility].
Patient off monitor for epidural placement.
[employer] [place] [employer] screening results were discussed with parent. Questions answered. Brochure given to parent. Advised to monitor developmental milestones and contact physician for any concerns.    Brian Allred, AuD
83.7 80.0 - 99.9 fL    MCH 27.0 26.0 - 35.0 pg    MCHC 32.3 32.0 - 34.5 %    RDW 13.2 11.5 - 15.0 fL    Platelets 716 514 - 920 E9/L    MPV 10.8 7.0 - 12.0 fL   TYPE AND SCREEN    Collection Time: 23  6:55 AM   Result Value Ref Range    ABO/Rh O POS     Antibody Screen NEG    CBC    Collection Time: 23  5:22 AM   Result Value Ref Range    WBC 9.9 4.5 - 11.5 E9/L    RBC 3.78 3.50 - 5.50 E12/L    Hemoglobin 10.3 (L) 11.5 - 15.5 g/dL    Hematocrit 31.5 (L) 34.0 - 48.0 %    MCV 83.3 80.0 - 99.9 fL    MCH 27.2 26.0 - 35.0 pg    MCHC 32.7 32.0 - 34.5 %    RDW 13.2 11.5 - 15.0 fL    Platelets 636 127 - 333 E9/L    MPV 11.0 7.0 - 12.0 fL       A: 25 y.o. female [id number] at 37w5d weeks  PPD #1 S/P ; Episiotomy was not needed, left periurethral tear  Patient Active Problem List   Diagnosis    History of stillbirth in currently pregnant patient    Pregnancy in multigravida    Fourth pregnancy    Diet controlled gestational diabetes mellitus (GDM) in third trimester    36 weeks gestation of pregnancy     (spontaneous vaginal delivery)    Term birth of  female    40 weeks gestation of pregnancy       P: Routine PP care.    Patient requesting d/c today    NICOLE Schmitz CNM  2023 7:27 AM

## 2023-05-24 ENCOUNTER — POSTPARTUM VISIT (OUTPATIENT)
Dept: OBGYN | Age: 28
End: 2023-05-24
Payer: COMMERCIAL

## 2023-05-24 VITALS
SYSTOLIC BLOOD PRESSURE: 128 MMHG | BODY MASS INDEX: 30.14 KG/M2 | HEART RATE: 83 BPM | WEIGHT: 204.1 LBS | DIASTOLIC BLOOD PRESSURE: 80 MMHG

## 2023-05-24 DIAGNOSIS — N93.8 DUB (DYSFUNCTIONAL UTERINE BLEEDING): ICD-10-CM

## 2023-05-24 DIAGNOSIS — Z01.812 PRE-PROCEDURE LAB EXAM: ICD-10-CM

## 2023-05-24 LAB
CONTROL: NORMAL
PREGNANCY TEST URINE, POC: NEGATIVE

## 2023-05-24 PROCEDURE — 99213 OFFICE O/P EST LOW 20 MIN: CPT | Performed by: ADVANCED PRACTICE MIDWIFE

## 2023-05-24 PROCEDURE — 81025 URINE PREGNANCY TEST: CPT | Performed by: ADVANCED PRACTICE MIDWIFE

## 2023-05-24 PROCEDURE — 96372 THER/PROPH/DIAG INJ SC/IM: CPT | Performed by: ADVANCED PRACTICE MIDWIFE

## 2023-05-24 RX ORDER — MEDROXYPROGESTERONE ACETATE 150 MG/ML
150 INJECTION, SUSPENSION INTRAMUSCULAR ONCE
Status: COMPLETED | OUTPATIENT
Start: 2023-05-24 | End: 2023-05-24

## 2023-05-24 RX ADMIN — MEDROXYPROGESTERONE ACETATE 150 MG: 150 INJECTION, SUSPENSION INTRAMUSCULAR at 12:19

## 2023-05-24 NOTE — PATIENT INSTRUCTIONS
After Pregnancy: Exercises  Introduction  Here are some examples of exercises that can help after pregnancy. Start each exercise slowly. Ease off the exercise if you start to have pain. Your doctor or physical therapist will tell you when you can start these exercises and which ones will work best for you. How to do the exercises  Tummy tuck  Lie on your back, and place two fingers just inside your hip bones so you can feel your lower belly muscles. Take a deep breath in. As you breathe out, pull your belly button in toward your spine, as if you are trying to zip up a tight pair of jeans. You should feel your lower belly muscles pull slightly away from your fingers as the muscles tighten. Hold for about 6 seconds, but do not hold your breath. Repeat 8 to 12 times. Repeat several times a day, and try to hold your lower belly muscles in for longer as you get stronger. Practice doing this exercise while you are standing, such as when you are standing in line, or sitting. Heel slides  Lie on the floor with your knees bent, and place two fingers just inside your hip bones so you can feel your lower belly muscles. Your feet should be flat on the floor. Pull your belly button in toward your spine. You should feel your lower belly muscles pull slightly away from your fingers as the muscles tighten. Keep holding your belly button in as you slowly slide one foot along the floor until your leg is out straight. Slowly slide the leg back to your starting position while making sure that you keep holding your belly button in. Do not arch or move your back as you are doing this. Do not hold your breath. Relax and repeat with your other leg. Repeat 8 to 12 times. Knee-to-chest stretch  Lie on your back with one knee bent and the other leg straight. Clasp your hands together under your bent knee and bring the knee to your chest. Keep your lower back pressed to the floor.   If it hurts your back to keep your

## 2023-05-24 NOTE — PROGRESS NOTES
Here today for pp check up. Patient breast feeding but about to switch to formula. Pp depression screening score 0. Started regular menses yesterday. Patient alert and pleasant with no concerns  Here today for Depo-provera  Urine for pregnancy obtained with negative results  Depo-provera 150 mg IM given without difficulty . Paperwork initiated for [employer]. Discharge instructions have been discussed with the patient. Patient advised to call our office with any questions or concerns. Voiced understanding.

## 2023-05-24 NOTE — PROGRESS NOTES
SUBJECTIVE:   25 y.o. Q2G3212 here for post partum exam. Pt bottle feeding without difficulty. Pt with irregular VB since delivery and no complaints. Patient denies any postpartum depression or any baby blues. Bleeding stopped but pt thinks she started period today  Denies s/s of PPD  Getting some sleep - baby slept through the night last night  Has not had intercourse yet, will wait until 6 weeks and will use condoms x 2 weeks. Review of Systems:  General ROS: negative  Psychological ROS: negative  ENT ROS: negative  Endocrine ROS: negative  Respiratory ROS: no cough, shortness of breath, or wheezing  Cardiovascular ROS: no chest pain or dyspnea on exertion  Gastrointestinal ROS: no abdominal pain, change in bowel habits, or black or bloody stools  Genito-Urinary ROS: no dysuria, trouble voiding, or hematuria  Musculoskeletal ROS: negative  Neurological ROS: no TIA or stroke symptoms  Dermatological ROS: negative    OBJECTIVE:   /80 (Position: Sitting)   Pulse 83   Wt 204 lb 1.6 oz (92.6 kg)   LMP 05/23/2023 (Exact Date)   Breastfeeding Yes Comment: going to be switching to formula  BMI 30.14 kg/m²   Patient's last menstrual period was 05/23/2023 (exact date). Physical Exam:  GEN: She appears well, afebrile. HEENT: normal cephalic, atraumatic  CVS: regular rate and rhythm  ABDOMEN: benign, soft, nontender, no masses. MUSCULOSKELETAL: normal gait, no masses  SKIN: normal texture and tone, no lesions  NEURO: normal tone, no hyperreflexia, 1+DTRs throughout      ASSESSMENT:      Diagnosis Orders   1. Routine postpartum follow-up  medroxyPROGESTERone (DEPO-PROVERA) injection 150 mg      2. DUB (dysfunctional uterine bleeding)  medroxyPROGESTERone (DEPO-PROVERA) injection 150 mg      3. Pre-procedure lab exam  POCT urine pregnancy          PLAN:   Past medical, social and family history reviewed and updated in pt's chart. Post partum care reviewed with patient.  Pt adjusting well to infant and

## 2023-05-27 PROBLEM — Z34.80 PREGNANCY IN MULTIGRAVIDA: Status: RESOLVED | Noted: 2022-10-26 | Resolved: 2023-05-27

## 2023-05-27 PROBLEM — Z34.90 FOURTH PREGNANCY: Status: RESOLVED | Noted: 2022-12-01 | Resolved: 2023-05-27

## 2023-05-27 PROBLEM — Z3A.37 37 WEEKS GESTATION OF PREGNANCY: Status: RESOLVED | Noted: 2023-04-19 | Resolved: 2023-05-27

## 2023-05-27 PROBLEM — O24.410 DIET CONTROLLED GESTATIONAL DIABETES MELLITUS (GDM) IN THIRD TRIMESTER: Status: RESOLVED | Noted: 2023-02-15 | Resolved: 2023-05-27

## 2023-05-27 PROBLEM — O09.299 HISTORY OF STILLBIRTH IN CURRENTLY PREGNANT PATIENT: Status: RESOLVED | Noted: 2022-10-26 | Resolved: 2023-05-27

## 2023-05-27 PROBLEM — Z37.9 NORMAL LABOR: Status: RESOLVED | Noted: 2023-04-18 | Resolved: 2023-05-27

## 2023-07-19 ENCOUNTER — PROCEDURE VISIT (OUTPATIENT)
Dept: OBGYN | Age: 28
End: 2023-07-19
Payer: COMMERCIAL

## 2023-07-19 VITALS
WEIGHT: 210.7 LBS | BODY MASS INDEX: 31.11 KG/M2 | HEART RATE: 91 BPM | SYSTOLIC BLOOD PRESSURE: 138 MMHG | DIASTOLIC BLOOD PRESSURE: 85 MMHG

## 2023-07-19 DIAGNOSIS — Z30.430 ENCOUNTER FOR INSERTION OF MIRENA IUD: Primary | ICD-10-CM

## 2023-07-19 DIAGNOSIS — Z01.812 PRE-PROCEDURE LAB EXAM: ICD-10-CM

## 2023-07-19 LAB
CONTROL: NORMAL
PREGNANCY TEST URINE, POC: NEGATIVE

## 2023-07-19 PROCEDURE — 58300 INSERT INTRAUTERINE DEVICE: CPT | Performed by: ADVANCED PRACTICE MIDWIFE

## 2023-07-19 PROCEDURE — 81025 URINE PREGNANCY TEST: CPT | Performed by: ADVANCED PRACTICE MIDWIFE

## 2023-07-19 PROCEDURE — 99214 OFFICE O/P EST MOD 30 MIN: CPT | Performed by: ADVANCED PRACTICE MIDWIFE

## 2023-07-19 ASSESSMENT — PATIENT HEALTH QUESTIONNAIRE - PHQ9
2. FEELING DOWN, DEPRESSED OR HOPELESS: 0
SUM OF ALL RESPONSES TO PHQ QUESTIONS 1-9: 0
SUM OF ALL RESPONSES TO PHQ QUESTIONS 1-9: 0
1. LITTLE INTEREST OR PLEASURE IN DOING THINGS: 0
SUM OF ALL RESPONSES TO PHQ QUESTIONS 1-9: 0
SUM OF ALL RESPONSES TO PHQ9 QUESTIONS 1 & 2: 0
SUM OF ALL RESPONSES TO PHQ QUESTIONS 1-9: 0

## 2023-07-19 NOTE — PROGRESS NOTES
IUD Insertion Procedure Note    Pre-operative Diagnosis: HMB, DUB    Post-operative Diagnosis: normal    Indications: HMB, undesired fertility    Procedure Details   Urine pregnancy test was given. Results for POC orders placed in visit on 07/19/23   POCT urine pregnancy   Result Value Ref Range    Preg Test, Ur negative     Control         The risks (including infection, bleeding, pain, and uterine perforation) and benefits of the procedure were explained to the patient and Written informed consent was obtained. Cervix cleansed with Betadine. Uterus sounded to 9 cm. IUD inserted without difficulty. String visible and trimmed to the external OS per patient request, states that last IUD gave her partner a laceration on his penis. Patient tolerated procedure well. Post-procedure instructions given: Yes  Bleeding/infections precautions given: Yes    IUD Information:  Mirena    Condition:  Stable    Complications:  None    Plan:  The patient was advised to call for any fever or for prolonged or severe pain or bleeding. She was advised to use OTC acetaminophen, OTC ibuprofen as needed for mild to moderate pain. Follow up:  Return in about 3 months (around 10/19/2023), or if symptoms worsen or fail to improve, for Med follow up.     NICOLE Mims CNM 7/19/23

## 2023-07-20 PROBLEM — Z30.430 ENCOUNTER FOR INSERTION OF MIRENA IUD: Status: ACTIVE | Noted: 2023-07-20

## 2023-09-29 ENCOUNTER — TELEPHONE (OUTPATIENT)
Dept: OBGYN | Age: 28
End: 2023-09-29

## 2023-10-11 ENCOUNTER — OFFICE VISIT (OUTPATIENT)
Dept: OBGYN | Age: 28
End: 2023-10-11
Payer: COMMERCIAL

## 2023-10-11 VITALS
HEIGHT: 69 IN | HEART RATE: 95 BPM | WEIGHT: 211.6 LBS | BODY MASS INDEX: 31.34 KG/M2 | DIASTOLIC BLOOD PRESSURE: 87 MMHG | SYSTOLIC BLOOD PRESSURE: 134 MMHG

## 2023-10-11 DIAGNOSIS — N64.89 BREAST ASYMMETRY IN FEMALE: ICD-10-CM

## 2023-10-11 DIAGNOSIS — Z30.431 IUD CHECK UP: ICD-10-CM

## 2023-10-11 DIAGNOSIS — N63.0 BREAST LUMP IN FEMALE: Primary | ICD-10-CM

## 2023-10-11 PROCEDURE — 99213 OFFICE O/P EST LOW 20 MIN: CPT | Performed by: ADVANCED PRACTICE MIDWIFE

## 2023-10-11 NOTE — PROGRESS NOTES
Pt alert and pleasant. She is here today for a lump in her right breast.She denies any pain and discharge. She says it is not tender to     touch. She has no other concerns today. Discharge instructions have been discussed with the patient. Patient advised to call our office with any questions or concerns. Voiced understanding.
Eyes: PERRLA  Chest: no skin abnormalities, no masses  Breast:  (Chest)  No nipple retraction or dimpling, No nipple discharge or bleeding, No axillary or supraclavicular adenopathy, positive findings:  palpable, non-delineated mass upper outer quadrant of R breast between 10:00 and 12:00, approximately 1-2cm from nipple. Right breast larger than L breast, non-tender. Self breast exams were reviewed in detail. Literature was given  Lungs: normal, non-labored respiratory effort  Heart: denies cp  Abdomen: soft, non-tender; bowel sounds normal; no masses,  no organomegaly  Back: No CVAT, no skin abnormalities  Neurologic: Alert and oriented x 3. Grossly normal  Extremities: symmetrical without clubbing cyanosis or edema. Pelvic Exam:   EFG: normal external genitalia, no external lesions  URETHRAL MEATUS: normal size, no diverticula   URETHRA: normal appearing without diverticula or lesions  BLADDER:  No masses or tenderness  VAGINA: normal rugae, no discharge   CERVIX: normal appearing, no lesions, no IUD parts, string or proximal end, visible (as expected)  UTERUS: uterus is normal size, shape, consistency and nontender   ADNEXA: normal adnexa in size, nontender and no masses. PERINEUM: normal appearing without lesions or masses  RECTUM: no hemorrhoids or rectal masses. Assessment:     Diagnosis Orders   1. Breast lump in female  US BREAST LIMITED RIGHT    MARCELO DIGITAL DIAGNOSTIC W OR WO CAD BILATERAL      2. Breast asymmetry in female        3. IUD check up              Plan:   IUD: no other follow up needed unless suspicion of displacement and for replacement at 5 years. Breast: reassured patient that palpated mass likely not concerning but will order diagnostic mammogram and breast ultrasound out of an abundance of caution. Patient agrees with poc.      Orders Placed This Encounter   Procedures    US BREAST LIMITED RIGHT     Standing Status:   Future     Standing Expiration Date:   10/11/2024

## 2023-10-17 ENCOUNTER — HOSPITAL ENCOUNTER (OUTPATIENT)
Dept: ULTRASOUND IMAGING | Age: 28
Discharge: HOME | End: 2023-10-17
Payer: COMMERCIAL

## 2023-10-17 ENCOUNTER — HOSPITAL ENCOUNTER (OUTPATIENT)
Dept: MAMMOGRAPHY | Age: 28
Discharge: HOME | End: 2023-10-19
Payer: COMMERCIAL

## 2023-10-17 VITALS — HEIGHT: 69 IN | WEIGHT: 215 LBS | BODY MASS INDEX: 31.84 KG/M2

## 2023-10-17 DIAGNOSIS — N63.10 MASS OF RIGHT BREAST, UNSPECIFIED QUADRANT: ICD-10-CM

## 2023-10-17 DIAGNOSIS — R92.8 ABNORMAL MAMMOGRAM: ICD-10-CM

## 2023-10-17 PROCEDURE — G0279 TOMOSYNTHESIS, MAMMO: HCPCS

## 2023-10-17 PROCEDURE — 76642 ULTRASOUND BREAST LIMITED: CPT

## 2023-10-18 VITALS
HEIGHT: 69 IN | HEART RATE: 93 BPM | DIASTOLIC BLOOD PRESSURE: 94 MMHG | BODY MASS INDEX: 31.84 KG/M2 | TEMPERATURE: 98.8 F | WEIGHT: 214.95 LBS | RESPIRATION RATE: 16 BRPM | SYSTOLIC BLOOD PRESSURE: 126 MMHG | OXYGEN SATURATION: 100 %

## 2023-10-18 PROCEDURE — 99281 EMR DPT VST MAYX REQ PHY/QHP: CPT

## 2023-10-18 ASSESSMENT — PAIN - FUNCTIONAL ASSESSMENT: PAIN_FUNCTIONAL_ASSESSMENT: 0-10

## 2023-10-18 ASSESSMENT — PAIN DESCRIPTION - LOCATION: LOCATION: HEAD

## 2023-10-18 ASSESSMENT — PAIN DESCRIPTION - PAIN TYPE: TYPE: ACUTE PAIN

## 2023-10-18 ASSESSMENT — PAIN SCALES - GENERAL: PAINLEVEL_OUTOF10: 6

## 2023-10-19 ENCOUNTER — HOSPITAL ENCOUNTER (EMERGENCY)
Age: 28
Discharge: LEFT BEFORE DISCHARGE | End: 2023-10-19
Payer: COMMERCIAL

## 2023-10-19 NOTE — ED NOTES
Department of Emergency Medicine  FIRST PROVIDER TRIAGE NOTE             Independent MLREYNOLD           10/18/23  9:06 PM EDT    Date of Encounter: 10/18/23   MRN: [id number]      HPI: Kwabena Duque is a 25 y.o. female who presents to the ED for Headache (Started a couple days ago.)     Headache in the back of her head and behind her eyes. Does wear contacts and glasses. Denies any injury. Denies any history of migraines. ROS: Negative for cp, sob, or fever. PE: Gen Appearance/Constitutional: alert  Musculoskeletal: moves all extremities x 4     Initial Plan of Care: All treatment areas with department are currently occupied. Plan to order/Initiate the following while awaiting opening in ED: labs and imaging studies.   Initiate Treatment-Testing, Proceed toTreatment Area When Bed Available for ED Attending/CHERIE to Continue Care    Electronically signed by NICOLE Haq CNP   DD: 10/18/23       NICOLE Haq CNP  10/18/23 0011

## 2023-10-19 NOTE — ED NOTES
Patient did not want to wait any longer and signed an Ed withdraw of request for medical treatment form. Patient was advised to follow up with primary care doctor or if symptoms got worse to come back to the emergency room.      Luz Dawson  10/19/23 4363

## 2023-11-22 ENCOUNTER — TELEPHONE (OUTPATIENT)
Dept: OBGYN | Age: 28
End: 2023-11-22

## 2024-03-20 PROCEDURE — 99284 EMERGENCY DEPT VISIT MOD MDM: CPT

## 2024-03-20 PROCEDURE — 96374 THER/PROPH/DIAG INJ IV PUSH: CPT

## 2024-03-20 PROCEDURE — 96375 TX/PRO/DX INJ NEW DRUG ADDON: CPT

## 2024-03-20 ASSESSMENT — PAIN - FUNCTIONAL ASSESSMENT: PAIN_FUNCTIONAL_ASSESSMENT: 0-10

## 2024-03-20 ASSESSMENT — PAIN DESCRIPTION - PAIN TYPE: TYPE: ACUTE PAIN

## 2024-03-20 ASSESSMENT — LIFESTYLE VARIABLES
HOW OFTEN DO YOU HAVE A DRINK CONTAINING ALCOHOL: NEVER
HOW MANY STANDARD DRINKS CONTAINING ALCOHOL DO YOU HAVE ON A TYPICAL DAY: PATIENT DOES NOT DRINK

## 2024-03-20 ASSESSMENT — PAIN DESCRIPTION - DESCRIPTORS: DESCRIPTORS: PRESSURE;THROBBING

## 2024-03-20 ASSESSMENT — PAIN DESCRIPTION - FREQUENCY: FREQUENCY: CONTINUOUS

## 2024-03-20 ASSESSMENT — PAIN SCALES - GENERAL: PAINLEVEL_OUTOF10: 10

## 2024-03-20 ASSESSMENT — PAIN DESCRIPTION - LOCATION: LOCATION: HEAD

## 2024-03-21 ENCOUNTER — HOSPITAL ENCOUNTER (EMERGENCY)
Age: 29
Discharge: HOME | End: 2024-03-21

## 2024-03-21 ENCOUNTER — APPOINTMENT (OUTPATIENT)
Dept: CT IMAGING | Age: 29
End: 2024-03-21

## 2024-03-21 VITALS
HEART RATE: 91 BPM | RESPIRATION RATE: 16 BRPM | WEIGHT: 220 LBS | HEIGHT: 69 IN | TEMPERATURE: 97.8 F | OXYGEN SATURATION: 100 % | BODY MASS INDEX: 32.58 KG/M2 | SYSTOLIC BLOOD PRESSURE: 139 MMHG | DIASTOLIC BLOOD PRESSURE: 86 MMHG

## 2024-03-21 DIAGNOSIS — R51.9 ACUTE NONINTRACTABLE HEADACHE, UNSPECIFIED HEADACHE TYPE: Primary | ICD-10-CM

## 2024-03-21 LAB
ANION GAP SERPL CALCULATED.3IONS-SCNC: 10 MMOL/L (ref 7–16)
BASOPHILS # BLD: 0.07 K/UL (ref 0–0.2)
BASOPHILS NFR BLD: 1 % (ref 0–2)
BUN SERPL-MCNC: 7 MG/DL (ref 6–20)
CALCIUM SERPL-MCNC: 10 MG/DL (ref 8.6–10.2)
CHLORIDE SERPL-SCNC: 100 MMOL/L (ref 98–107)
CO2 SERPL-SCNC: 26 MMOL/L (ref 22–29)
CREAT SERPL-MCNC: 0.7 MG/DL (ref 0.5–1)
EOSINOPHIL # BLD: 0.08 K/UL (ref 0.05–0.5)
EOSINOPHILS RELATIVE PERCENT: 1 % (ref 0–6)
ERYTHROCYTE [DISTWIDTH] IN BLOOD BY AUTOMATED COUNT: 12.6 % (ref 11.5–15)
GFR SERPL CREATININE-BSD FRML MDRD: >60 ML/MIN/1.73M2
GLUCOSE SERPL-MCNC: 109 MG/DL (ref 74–99)
HCG, URINE, POC: NEGATIVE
HCT VFR BLD AUTO: 43.7 % (ref 34–48)
HGB BLD-MCNC: 14.5 G/DL (ref 11.5–15.5)
IMM GRANULOCYTES # BLD AUTO: 0.03 K/UL (ref 0–0.58)
IMM GRANULOCYTES NFR BLD: 0 % (ref 0–5)
INFLUENZA A BY PCR: NOT DETECTED
INFLUENZA B BY PCR: NOT DETECTED
LYMPHOCYTES NFR BLD: 1.69 K/UL (ref 1.5–4)
LYMPHOCYTES RELATIVE PERCENT: 21 % (ref 20–42)
Lab: NORMAL
MCH RBC QN AUTO: 28.5 PG (ref 26–35)
MCHC RBC AUTO-ENTMCNC: 33.2 G/DL (ref 32–34.5)
MCV RBC AUTO: 86 FL (ref 80–99.9)
MONOCYTES NFR BLD: 0.56 K/UL (ref 0.1–0.95)
MONOCYTES NFR BLD: 7 % (ref 2–12)
NEGATIVE QC PASS/FAIL: NORMAL
NEUTROPHILS NFR BLD: 69 % (ref 43–80)
NEUTS SEG NFR BLD: 5.47 K/UL (ref 1.8–7.3)
PLATELET # BLD AUTO: 290 K/UL (ref 130–450)
PMV BLD AUTO: 10 FL (ref 7–12)
POSITIVE QC PASS/FAIL: NORMAL
POTASSIUM SERPL-SCNC: 4.1 MMOL/L (ref 3.5–5)
RBC # BLD AUTO: 5.08 M/UL (ref 3.5–5.5)
SARS-COV-2 RDRP RESP QL NAA+PROBE: NOT DETECTED
SODIUM SERPL-SCNC: 136 MMOL/L (ref 132–146)
SPECIMEN DESCRIPTION: NORMAL
WBC OTHER # BLD: 7.9 K/UL (ref 4.5–11.5)

## 2024-03-21 PROCEDURE — 80048 BASIC METABOLIC PNL TOTAL CA: CPT

## 2024-03-21 PROCEDURE — 6360000002 HC RX W HCPCS

## 2024-03-21 PROCEDURE — 96375 TX/PRO/DX INJ NEW DRUG ADDON: CPT

## 2024-03-21 PROCEDURE — 87502 INFLUENZA DNA AMP PROBE: CPT

## 2024-03-21 PROCEDURE — 87635 SARS-COV-2 COVID-19 AMP PRB: CPT

## 2024-03-21 PROCEDURE — 2580000003 HC RX 258

## 2024-03-21 PROCEDURE — 96374 THER/PROPH/DIAG INJ IV PUSH: CPT

## 2024-03-21 PROCEDURE — 70450 CT HEAD/BRAIN W/O DYE: CPT

## 2024-03-21 PROCEDURE — 85025 COMPLETE CBC W/AUTO DIFF WBC: CPT

## 2024-03-21 RX ORDER — METOCLOPRAMIDE HYDROCHLORIDE 5 MG/ML
10 INJECTION INTRAMUSCULAR; INTRAVENOUS ONCE
Status: COMPLETED | OUTPATIENT
Start: 2024-03-21 | End: 2024-03-21

## 2024-03-21 RX ORDER — FLUTICASONE PROPIONATE 50 MCG
2 SPRAY, SUSPENSION (ML) NASAL DAILY
Qty: 16 G | Refills: 0 | Status: SHIPPED | OUTPATIENT
Start: 2024-03-21

## 2024-03-21 RX ORDER — DIPHENHYDRAMINE HYDROCHLORIDE 50 MG/ML
25 INJECTION INTRAMUSCULAR; INTRAVENOUS ONCE
Status: COMPLETED | OUTPATIENT
Start: 2024-03-21 | End: 2024-03-21

## 2024-03-21 RX ORDER — 0.9 % SODIUM CHLORIDE 0.9 %
1000 INTRAVENOUS SOLUTION INTRAVENOUS ONCE
Status: COMPLETED | OUTPATIENT
Start: 2024-03-21 | End: 2024-03-21

## 2024-03-21 RX ORDER — IBUPROFEN 600 MG/1
600 TABLET ORAL 3 TIMES DAILY PRN
Qty: 30 TABLET | Refills: 0 | Status: SHIPPED | OUTPATIENT
Start: 2024-03-21

## 2024-03-21 RX ADMIN — METOCLOPRAMIDE 10 MG: 5 INJECTION, SOLUTION INTRAMUSCULAR; INTRAVENOUS at 01:49

## 2024-03-21 RX ADMIN — SODIUM CHLORIDE 1000 ML: 9 INJECTION, SOLUTION INTRAVENOUS at 01:47

## 2024-03-21 RX ADMIN — DIPHENHYDRAMINE HYDROCHLORIDE 25 MG: 50 INJECTION, SOLUTION INTRAMUSCULAR; INTRAVENOUS at 01:49

## 2024-03-21 ASSESSMENT — PAIN SCALES - GENERAL: PAINLEVEL_OUTOF10: 10

## 2024-03-21 ASSESSMENT — PAIN - FUNCTIONAL ASSESSMENT: PAIN_FUNCTIONAL_ASSESSMENT: 0-10

## 2024-03-21 NOTE — DISCHARGE INSTR - COC
Delivery   {[facility] [place] [place] MED Delivery:852536057}    Wound Care Documentation and Therapy:        Elimination:  Continence:   Bowel: {YES / NO:}  Bladder: {YES / NO:}  Urinary Catheter: {Urinary Catheter:443466723}   Colostomy/Ileostomy/Ileal Conduit: {YES / NO:}       Date of Last BM: ***  No intake or output data in the 24 hours ending 24 0508  No intake/output data recorded.    Safety Concerns:     { [facility] Safety Concerns:746918815}    Impairments/Disabilities:      {[facility] [place] [place] Impairments/Disabilities:637426386}    Nutrition Therapy:  Current Nutrition Therapy:   {[facility] [place] [place] Diet List:532592706}    Routes of Feeding: {[facility] Other Feedings:295024200}  Liquids: {Slp liquid thickness:57297}  Daily Fluid Restriction: {[facility] DME Yes amt example:899237903}  Last Modified Barium Swallow with Video (Video Swallowing Test): {Done Not Done Date:676116208}    Treatments at the Time of Hospital Discharge:   Respiratory Treatments: ***  Oxygen Therapy:  {Therapy; copd oxygen:93500}  Ventilator:    {[facility] Vent List:724212005}    Rehab Therapies: {THERAPEUTIC INTERVENTION:8434822060}  Weight Bearing Status/Restrictions: {[facility] Weight Bearin}  Other Medical Equipment (for information only, NOT a DME order):  {EQUIPMENT:886683165}  Other Treatments: ***    Patient's personal belongings (please select all that are sent with patient):  {[facility] DME Belongings:559266210}    RN SIGNATURE:  {Esignature:622768949}    CASE MANAGEMENT/SOCIAL WORK SECTION    Inpatient Status Date: ***    Readmission Risk Assessment Score:  Readmission Risk              Risk of Unplanned Readmission:  0           Discharging to Facility/ Agency   Name:   Address:  Phone:  Fax:    Dialysis Facility (if applicable)   Name:  Address:  Dialysis Schedule:  Phone:  Fax:    / signature: {Esignature:934313965}    PHYSICIAN SECTION    Prognosis: {Prognosis:4703007910}    Condition at Discharge: { Patient

## 2024-03-21 NOTE — ED PROVIDER NOTES
fluids, IV Benadryl, and IV Reglan for their symptoms with moderate improvement. Patient will be discharged home with the following prescriptions, Flonase, Tylenol and ibuprofen.  Discussed appropriate use and potential side effects of starting the prescribed medications. Patient continues to be non-toxic on re-evaluation. Findings were discussed with the patient and reasons to immediately return to the ED were articulated to them. They will follow-up with their PMD and patient was understanding and agreeable to plan.  All questions were addressed.  They understand return precautions and discharge instructions. The patient expressed understanding. Vitals were stable and they were in no distress at discharge.       Discharge Instructions:   Patient referred to  [facility]  [phone]  Schedule an appointment as soon as possible for a visit in 3 days  For follow up      MEDICATIONS:   DISCHARGE MEDICATIONS:  Discharge Medication List as of 3/21/2024  5:01 AM        START taking these medications    Details   fluticasone (FLONASE) 50 MCG/ACT nasal spray 2 sprays by Each Nostril route daily, Disp-16 g, R-0Normal      ibuprofen (ADVIL;MOTRIN) 600 MG tablet Take 1 tablet by mouth 3 times daily as needed for Pain, Disp-30 tablet, R-0Normal             DISCONTINUED MEDICATIONS:  Discharge Medication List as of 3/21/2024  5:01 AM          Record Review:  Records Reviewed : None       Disposition Considerations:  This patient's ED course included: a personal history and physicial examination and re-evaluation prior to disposition  This patient has remained hemodynamically stable and improved during their ED course.         Assessment      1. Acute nonintractable headache, unspecified headache type      Plan   Discharged home.  Patient condition is good    New Medications     Discharge Medication List as of 3/21/2024  5:01 AM        START taking these medications    Details   fluticasone (FLONASE) 50 MCG/ACT

## 2024-05-25 NOTE — PROGRESS NOTES
Patient alert and pleasant with no new complaints  Here today for Mirena placement   Mirena ordered through [facility] and sent to this office from [facility]. Consent form signed and scanned  Urine for pregnancy obtained with negative results  Assisted doctor with Mirena placement. Patient tolerated well  All Mirena information scanned into chart  No bathes, no tampons, nothing in the vagina for 2 days   Discharge instructions have been discussed with the patient. Patient advised to call our office with any questions or concerns. Voiced understanding. 1880

## 2024-08-11 ENCOUNTER — HOSPITAL ENCOUNTER (EMERGENCY)
Age: 29
Discharge: HOME | End: 2024-08-11

## 2024-08-11 VITALS
TEMPERATURE: 98.8 F | SYSTOLIC BLOOD PRESSURE: 159 MMHG | WEIGHT: 225 LBS | OXYGEN SATURATION: 100 % | BODY MASS INDEX: 33.33 KG/M2 | RESPIRATION RATE: 16 BRPM | DIASTOLIC BLOOD PRESSURE: 97 MMHG | HEART RATE: 73 BPM | HEIGHT: 69 IN

## 2024-08-11 DIAGNOSIS — K08.89 PAIN, DENTAL: Primary | ICD-10-CM

## 2024-08-11 LAB
HCG, URINE, POC: NEGATIVE
Lab: NORMAL
NEGATIVE QC PASS/FAIL: NORMAL
POSITIVE QC PASS/FAIL: NORMAL

## 2024-08-11 PROCEDURE — 6370000000 HC RX 637 (ALT 250 FOR IP): Performed by: NURSE PRACTITIONER

## 2024-08-11 PROCEDURE — 99283 EMERGENCY DEPT VISIT LOW MDM: CPT

## 2024-08-11 RX ORDER — CLINDAMYCIN HYDROCHLORIDE 150 MG/1
450 CAPSULE ORAL ONCE
Status: COMPLETED | OUTPATIENT
Start: 2024-08-11 | End: 2024-08-11

## 2024-08-11 RX ORDER — IBUPROFEN 800 MG/1
800 TABLET ORAL ONCE
Status: COMPLETED | OUTPATIENT
Start: 2024-08-11 | End: 2024-08-11

## 2024-08-11 RX ORDER — IBUPROFEN 800 MG/1
800 TABLET ORAL EVERY 8 HOURS PRN
Qty: 12 TABLET | Refills: 0 | Status: SHIPPED | OUTPATIENT
Start: 2024-08-11 | End: 2024-08-15

## 2024-08-11 RX ORDER — CLINDAMYCIN HYDROCHLORIDE 150 MG/1
450 CAPSULE ORAL 3 TIMES DAILY
Qty: 87 CAPSULE | Refills: 0 | Status: SHIPPED | OUTPATIENT
Start: 2024-08-11 | End: 2024-08-21

## 2024-08-11 RX ADMIN — IBUPROFEN 800 MG: 800 TABLET, FILM COATED ORAL at 21:16

## 2024-08-11 RX ADMIN — CLINDAMYCIN HYDROCHLORIDE 450 MG: 150 CAPSULE ORAL at 21:16

## 2024-08-11 ASSESSMENT — PAIN DESCRIPTION - ORIENTATION: ORIENTATION: LEFT

## 2024-08-11 ASSESSMENT — PAIN - FUNCTIONAL ASSESSMENT: PAIN_FUNCTIONAL_ASSESSMENT: 0-10

## 2024-08-11 ASSESSMENT — PAIN DESCRIPTION - DESCRIPTORS: DESCRIPTORS: DULL;SHARP

## 2024-08-11 ASSESSMENT — PAIN SCALES - GENERAL: PAINLEVEL_OUTOF10: 8

## 2024-08-11 ASSESSMENT — PAIN DESCRIPTION - FREQUENCY: FREQUENCY: CONTINUOUS

## 2024-08-12 NOTE — ED PROVIDER NOTES
Independent CHARLENE Visit.       [facility]  Department of Emergency Medicine   ED  Encounter Note  Admit Date/RoomTime: 2024  8:29 PM  ED Room:     NAME: Karina Hodges  : 1995  MRN: [id number]     Chief Complaint:  Dental Pain    History of Present Illness        Karina Hodges is a 29 y.o. old female who presents to the emergency department by private vehicle, for non-traumatic left upper dental pain, which occured a few week(s) prior to arrival.  Since onset the symptoms have been remaining constant and recurrent and worsening and moderate in severity.  Worsened by  chewing and improved by nothing.  Associated Signs & Symptoms:  nothing additional.  Patient states pain is around the tooth that has wires from her dental implant.  She denies any drainage.  States that she has a feeling that that fell out.  She states that a few weeks ago she was put on amoxicillin but was unable to get in to a dentist due to her not having dental insurance and states it returned.  Denies any shortness breath, chest pain, lightness or dizziness.  Denies any fevers, chills or difficulty swallowing           Onset:       Spontaneous:   yes.     Following Trauma:   no.     Previous Caries:   yes.     Recent Dental Procedure:   no.     ROS   Pertinent positives and negatives are stated within HPI, all other systems reviewed and are negative.    Past Medical History:  has a past medical history of Diet controlled gestational diabetes mellitus (GDM) in third trimester, History of stillbirth in currently pregnant patient, and  delivery.    Surgical History:  has a past surgical history that includes Cholecystectomy.    Social History:  reports that she has quit smoking. Her smoking use included cigarettes. She has never used smokeless tobacco. She reports that she does not currently use alcohol. She reports that she does not use drugs.    Family History: family history includes Cancer